# Patient Record
Sex: FEMALE | Race: WHITE | HISPANIC OR LATINO | URBAN - METROPOLITAN AREA
[De-identification: names, ages, dates, MRNs, and addresses within clinical notes are randomized per-mention and may not be internally consistent; named-entity substitution may affect disease eponyms.]

---

## 2019-05-12 ENCOUNTER — EMERGENCY (EMERGENCY)
Facility: HOSPITAL | Age: 34
LOS: 0 days | Discharge: HOME | End: 2019-05-12
Attending: STUDENT IN AN ORGANIZED HEALTH CARE EDUCATION/TRAINING PROGRAM | Admitting: STUDENT IN AN ORGANIZED HEALTH CARE EDUCATION/TRAINING PROGRAM
Payer: COMMERCIAL

## 2019-05-12 VITALS
TEMPERATURE: 98 F | RESPIRATION RATE: 18 BRPM | DIASTOLIC BLOOD PRESSURE: 58 MMHG | HEART RATE: 72 BPM | SYSTOLIC BLOOD PRESSURE: 115 MMHG

## 2019-05-12 VITALS
TEMPERATURE: 98 F | SYSTOLIC BLOOD PRESSURE: 124 MMHG | RESPIRATION RATE: 18 BRPM | HEART RATE: 79 BPM | OXYGEN SATURATION: 98 % | DIASTOLIC BLOOD PRESSURE: 77 MMHG

## 2019-05-12 DIAGNOSIS — O26.91 PREGNANCY RELATED CONDITIONS, UNSPECIFIED, FIRST TRIMESTER: ICD-10-CM

## 2019-05-12 DIAGNOSIS — R10.31 RIGHT LOWER QUADRANT PAIN: ICD-10-CM

## 2019-05-12 DIAGNOSIS — Z3A.13 13 WEEKS GESTATION OF PREGNANCY: ICD-10-CM

## 2019-05-12 DIAGNOSIS — R10.9 UNSPECIFIED ABDOMINAL PAIN: ICD-10-CM

## 2019-05-12 LAB
ALBUMIN SERPL ELPH-MCNC: 4.4 G/DL — SIGNIFICANT CHANGE UP (ref 3.5–5.2)
ALP SERPL-CCNC: 60 U/L — SIGNIFICANT CHANGE UP (ref 30–115)
ALT FLD-CCNC: 32 U/L — SIGNIFICANT CHANGE UP (ref 0–41)
ANION GAP SERPL CALC-SCNC: 16 MMOL/L — HIGH (ref 7–14)
APPEARANCE UR: CLEAR — SIGNIFICANT CHANGE UP
AST SERPL-CCNC: 20 U/L — SIGNIFICANT CHANGE UP (ref 0–41)
BACTERIA # UR AUTO: ABNORMAL /HPF
BASOPHILS # BLD AUTO: 0.05 K/UL — SIGNIFICANT CHANGE UP (ref 0–0.2)
BASOPHILS NFR BLD AUTO: 0.5 % — SIGNIFICANT CHANGE UP (ref 0–1)
BILIRUB SERPL-MCNC: 0.3 MG/DL — SIGNIFICANT CHANGE UP (ref 0.2–1.2)
BILIRUB UR-MCNC: NEGATIVE — SIGNIFICANT CHANGE UP
BLD GP AB SCN SERPL QL: SIGNIFICANT CHANGE UP
BUN SERPL-MCNC: 9 MG/DL — LOW (ref 10–20)
CALCIUM SERPL-MCNC: 9.6 MG/DL — SIGNIFICANT CHANGE UP (ref 8.5–10.1)
CHLORIDE SERPL-SCNC: 103 MMOL/L — SIGNIFICANT CHANGE UP (ref 98–110)
CO2 SERPL-SCNC: 18 MMOL/L — SIGNIFICANT CHANGE UP (ref 17–32)
COLOR SPEC: YELLOW — SIGNIFICANT CHANGE UP
CREAT SERPL-MCNC: 0.6 MG/DL — LOW (ref 0.7–1.5)
DIFF PNL FLD: NEGATIVE — SIGNIFICANT CHANGE UP
EOSINOPHIL # BLD AUTO: 0.16 K/UL — SIGNIFICANT CHANGE UP (ref 0–0.7)
EOSINOPHIL NFR BLD AUTO: 1.7 % — SIGNIFICANT CHANGE UP (ref 0–8)
EPI CELLS # UR: ABNORMAL /HPF
GLUCOSE SERPL-MCNC: 99 MG/DL — SIGNIFICANT CHANGE UP (ref 70–99)
GLUCOSE UR QL: NEGATIVE MG/DL — SIGNIFICANT CHANGE UP
HCG SERPL-ACNC: HIGH MIU/ML
HCT VFR BLD CALC: 38.5 % — SIGNIFICANT CHANGE UP (ref 37–47)
HGB BLD-MCNC: 12.5 G/DL — SIGNIFICANT CHANGE UP (ref 12–16)
IMM GRANULOCYTES NFR BLD AUTO: 0.3 % — SIGNIFICANT CHANGE UP (ref 0.1–0.3)
KETONES UR-MCNC: NEGATIVE — SIGNIFICANT CHANGE UP
LACTATE SERPL-SCNC: 1.2 MMOL/L — SIGNIFICANT CHANGE UP (ref 0.5–2.2)
LEUKOCYTE ESTERASE UR-ACNC: ABNORMAL
LIDOCAIN IGE QN: 19 U/L — SIGNIFICANT CHANGE UP (ref 7–60)
LYMPHOCYTES # BLD AUTO: 2.16 K/UL — SIGNIFICANT CHANGE UP (ref 1.2–3.4)
LYMPHOCYTES # BLD AUTO: 23.3 % — SIGNIFICANT CHANGE UP (ref 20.5–51.1)
MCHC RBC-ENTMCNC: 27.2 PG — SIGNIFICANT CHANGE UP (ref 27–31)
MCHC RBC-ENTMCNC: 32.5 G/DL — SIGNIFICANT CHANGE UP (ref 32–37)
MCV RBC AUTO: 83.9 FL — SIGNIFICANT CHANGE UP (ref 81–99)
MONOCYTES # BLD AUTO: 0.45 K/UL — SIGNIFICANT CHANGE UP (ref 0.1–0.6)
MONOCYTES NFR BLD AUTO: 4.9 % — SIGNIFICANT CHANGE UP (ref 1.7–9.3)
NEUTROPHILS # BLD AUTO: 6.42 K/UL — SIGNIFICANT CHANGE UP (ref 1.4–6.5)
NEUTROPHILS NFR BLD AUTO: 69.3 % — SIGNIFICANT CHANGE UP (ref 42.2–75.2)
NITRITE UR-MCNC: NEGATIVE — SIGNIFICANT CHANGE UP
NRBC # BLD: 0 /100 WBCS — SIGNIFICANT CHANGE UP (ref 0–0)
PH UR: 7 — SIGNIFICANT CHANGE UP (ref 5–8)
PLATELET # BLD AUTO: 276 K/UL — SIGNIFICANT CHANGE UP (ref 130–400)
POTASSIUM SERPL-MCNC: 4.2 MMOL/L — SIGNIFICANT CHANGE UP (ref 3.5–5)
POTASSIUM SERPL-SCNC: 4.2 MMOL/L — SIGNIFICANT CHANGE UP (ref 3.5–5)
PROT SERPL-MCNC: 7.1 G/DL — SIGNIFICANT CHANGE UP (ref 6–8)
PROT UR-MCNC: ABNORMAL MG/DL
RBC # BLD: 4.59 M/UL — SIGNIFICANT CHANGE UP (ref 4.2–5.4)
RBC # FLD: 15.6 % — HIGH (ref 11.5–14.5)
SODIUM SERPL-SCNC: 137 MMOL/L — SIGNIFICANT CHANGE UP (ref 135–146)
SP GR SPEC: 1.02 — SIGNIFICANT CHANGE UP (ref 1.01–1.03)
TYPE + AB SCN PNL BLD: SIGNIFICANT CHANGE UP
UROBILINOGEN FLD QL: 0.2 MG/DL — SIGNIFICANT CHANGE UP (ref 0.2–0.2)
WBC # BLD: 9.27 K/UL — SIGNIFICANT CHANGE UP (ref 4.8–10.8)
WBC # FLD AUTO: 9.27 K/UL — SIGNIFICANT CHANGE UP (ref 4.8–10.8)
WBC UR QL: SIGNIFICANT CHANGE UP /HPF

## 2019-05-12 PROCEDURE — 99285 EMERGENCY DEPT VISIT HI MDM: CPT

## 2019-05-12 PROCEDURE — 76705 ECHO EXAM OF ABDOMEN: CPT | Mod: 26

## 2019-05-12 PROCEDURE — 76770 US EXAM ABDO BACK WALL COMP: CPT | Mod: 26

## 2019-05-12 PROCEDURE — 76815 OB US LIMITED FETUS(S): CPT | Mod: 26

## 2019-05-12 RX ORDER — MORPHINE SULFATE 50 MG/1
2 CAPSULE, EXTENDED RELEASE ORAL ONCE
Refills: 0 | Status: DISCONTINUED | OUTPATIENT
Start: 2019-05-12 | End: 2019-05-12

## 2019-05-12 RX ORDER — SODIUM CHLORIDE 9 MG/ML
1000 INJECTION, SOLUTION INTRAVENOUS ONCE
Refills: 0 | Status: COMPLETED | OUTPATIENT
Start: 2019-05-12 | End: 2019-05-12

## 2019-05-12 RX ORDER — MORPHINE SULFATE 50 MG/1
4 CAPSULE, EXTENDED RELEASE ORAL ONCE
Refills: 0 | Status: DISCONTINUED | OUTPATIENT
Start: 2019-05-12 | End: 2019-05-12

## 2019-05-12 RX ORDER — SODIUM CHLORIDE 9 MG/ML
3 INJECTION INTRAMUSCULAR; INTRAVENOUS; SUBCUTANEOUS ONCE
Refills: 0 | Status: COMPLETED | OUTPATIENT
Start: 2019-05-12 | End: 2019-05-12

## 2019-05-12 RX ORDER — ONDANSETRON 8 MG/1
4 TABLET, FILM COATED ORAL ONCE
Refills: 0 | Status: COMPLETED | OUTPATIENT
Start: 2019-05-12 | End: 2019-05-12

## 2019-05-12 RX ADMIN — SODIUM CHLORIDE 3 MILLILITER(S): 9 INJECTION INTRAMUSCULAR; INTRAVENOUS; SUBCUTANEOUS at 09:05

## 2019-05-12 RX ADMIN — SODIUM CHLORIDE 2000 MILLILITER(S): 9 INJECTION, SOLUTION INTRAVENOUS at 15:50

## 2019-05-12 RX ADMIN — MORPHINE SULFATE 4 MILLIGRAM(S): 50 CAPSULE, EXTENDED RELEASE ORAL at 11:47

## 2019-05-12 RX ADMIN — ONDANSETRON 4 MILLIGRAM(S): 8 TABLET, FILM COATED ORAL at 12:30

## 2019-05-12 RX ADMIN — MORPHINE SULFATE 2 MILLIGRAM(S): 50 CAPSULE, EXTENDED RELEASE ORAL at 08:58

## 2019-05-12 RX ADMIN — SODIUM CHLORIDE 2000 MILLILITER(S): 9 INJECTION, SOLUTION INTRAVENOUS at 08:58

## 2019-05-12 NOTE — ED ADULT NURSE NOTE - NSIMPLEMENTINTERV_GEN_ALL_ED
Implemented All Universal Safety Interventions:  [place] to call system. Call bell, personal items and telephone within reach. Instruct patient to call for assistance. Room bathroom lighting operational. Non-slip footwear when patient is off stretcher. Physically safe environment: no spills, clutter or unnecessary equipment. Stretcher in lowest position, wheels locked, appropriate side rails in place.

## 2019-05-12 NOTE — ED PROVIDER NOTE - CLINICAL SUMMARY MEDICAL DECISION MAKING FREE TEXT BOX
32 y/o Preg F p/w abd and back pain. US RUQ, Renal/ Bladder, Pelvis done, + GBS w/o sign elisabeth, o/w US's WNL. No new acute ED events, but pain persisted. Pt seen by OB, who consulted pt's private OB. On reassessment, pt had marked ttp at R sciatic notch and noted some pain radiating down R buttock; also noted pain character was hard to describe. consider radiculopathy. however, dx unclear, and other more worrisome dx could not be excluded. I recc admission to pt. Pt declined. we discussed benefit of admission, including further imaging and serial exam, and risk of leaving, including, worsening pain, loss of lifestyle, hospitalization, pt and fetal mortality, Pt understood risk and benefit, diagnostic uncertainty and still preferred discharge and f/up with PMD OB tomorrow. pt given strict return precaution. Pt dc'ed.

## 2019-05-12 NOTE — CONSULT NOTE ADULT - SUBJECTIVE AND OBJECTIVE BOX
Chief Complaint:    HPI: 32yo , at 13w0d, dated by LMP with MINNIE 19, with RLQ abdominal pain. She reports RLQ abdominal pain that started 1 week ago. She states the pain is intermittent, stabbing, with associated cramping that radiates to her back and up her abdomen. She reports the pain is worse when she changes positions, and only received relief from 2nd dose of morphine in the ED. No relief from tylenol or 1st dose of morphine in the ED. She saw Dr. Heredia on Tuesday, followed by treatment by a chiropractor on Thursday, which alleviated her pain only temporally. She has not had similar pain before. Denies vaginal bleeding or abnormal discharge. She reports sexual intercourse last Saturday (8 days ago) and last bowel movement yesterday, normal. Denies CP, SOB, RUQ pain/ Epigastric pain, N/V, LE pain/ swelling, diarrhea, pain/difficulty with urination, fevers, chills.     Ob/Gyn History:    LMP -   2/10/19   Cycle Length - 4-5 days, every 21 days. Menarche at 15yo.   FT Vaginal deliveryX1, [id number] in [place].   Denies history of ovarian cysts, uterine fibroids, abnormal paps, or STIs.   Last Pap Smear - 2019    Denies the following: constitutional symptoms, visual symptoms, cardiovascular symptoms, respiratory symptoms, GI symptoms, musculoskeletal symptoms, skin symptoms, neurologic symptoms, hematologic symptoms, allergic symptoms, psychiatric symptoms  Except any pertinent positives listed.     Home Medications: PNV    Allergies: No Known Allergies    PAST MEDICAL & SURGICAL HISTORY:  No pertinent past medical history    SOCIAL HISTORY: Denies cigarette use, alcohol use, or illicit drug use    Vital Signs Last 24 Hrs  T(F): 97.6 (12 May 2019 09:15), Max: 97.6 (12 May 2019 09:15)  HR: 79 (12 May 2019 09:15) (79 - 79)  BP: 124/77 (12 May 2019 09:15) (124/77 - 124/77)  RR: 18 (12 May 2019 09:15) (18 - 18)    General Appearance - AAOx3, NAD  Heart - S1S2 regular rate and rhythm  Lung - CTA Bilaterally  Abdomen - Soft, RLQ tenderness to palpation, nondistended, no rebound, no rigidity, no guarding, hyperactive bowel sounds present.    GYN/Pelvis:  Labia Majora - Normal  Labia Minora - Normal  Clitoris - Normal  Urethra - Normal  Vagina - Normal  Cervix - Normal    Uterus:  Size - Normal  Tenderness - None  Mass - None  Freely mobile    Adnexa:  Masses - None  Tenderness - None      LABS:                        12.5   9.27  )-----------( 276      ( 12 May 2019 08:32 )             38.5     HCG Quantitative, Serum: 66205.0 mIU/mL (19 @ 08:32)    Type + Screen: O POS (19 @ 08:32)  Antibody Screen: NEG (19 @ 08:32)    05    137  |  103  |  9<L>  ----------------------------<  99  4.2   |  18  |  0.6<L>    Ca    9.6      12 May 2019 08:32    TPro  7.1  /  Alb  4.4  /  TBili  0.3  /  DBili  x   /  AST  20  /  ALT  32  /  AlkPhos  60  0512      Urinalysis Basic - ( 12 May 2019 08:32 )  Color: Yellow / Appearance: Clear / S.025 / pH: x  Gluc: x / Ketone: Negative  / Bili: Negative / Urobili: 0.2 mg/dL   Blood: x / Protein: Trace mg/dL / Nitrite: Negative   Leuk Esterase: Trace / RBC: x / WBC 3-5 /HPF   Sq Epi: x / Non Sq Epi: Moderate /HPF / Bacteria: Few /HPF          RADIOLOGY & ADDITIONAL STUDIES:  < from: US OB Fetus Limited (19 @ 10:55) >    The uterus measures 14 x 4 x 7.8 cm and contains a single live   intrauterine pregnancy. The femur length measures 1.03 cm, corresponding   to a gestational age of 13 weeks, 0 days. Fetal heart rate is 159   beats/min.   There is no free pelvic fluid.  The right ovary measures 3 x 2.8 x 2 cm. and contains a corpus luteal   cyst measuring 1.8 x 1.6 x 1.7 cm. The left ovary was not visualized.  Doppler flow is demonstrated to both ovaries.    IMPRESSION:  Single live intrauterine pregnancy corresponding to a gestational age of   13 weeks, 0 days.   Fetal heart rate is 159 beat/min.  Nonvisualization of the left ovary.  Note that this study is not a full fetal survey.  < end of copied text >        < from: US Kidney and Bladder (19 @ 11:00) >  INTERPRETATION:  CLINICAL HISTORY / REASON FOR EXAM: Abdominal pain.    Patient is pregnant.  COMPARISON: None   PROCEDURE: Retroperitoneal ultrasound was performed.  FINDINGS:  RIGHT KIDNEY: Normal in echogenicity, size measuring 11.2 cm in length.   No evidence of hydronephrosis or calculus. Vascular flow is demonstrated   at the hilum.  LEFT KIDNEY: Normal in echogenicity, size measuring 11.7 cm in length. No   evidence of hydronephrosis or calculus. Vascular flow is demonstrated at   the hilum.   URINARY BLADDER: Prevoid volume of approximately 189 mL. No wall   thickening, debris or calculus is seen. Postvoid volume is approximately   0 mL.    IMPRESSION:  No sonographic evidence of nephrolithiasis.   < end of copied text > Chief Complaint:    HPI: 34yo , at 13w0d, dated by LMP with MINNIE 19, with RLQ abdominal pain. She reports RLQ abdominal pain that started 1 week ago. She states the pain is intermittent, stabbing, with associated cramping, burning pain that radiates to her back and up her abdomen. She reports the pain is worse when she changes positions, and only received relief from 2nd dose of morphine in the ED. No relief from tylenol or 1st dose of morphine in the ED. She saw Dr. Heredia on Tuesday, followed by treatment by a chiropractor on Thursday, which alleviated her pain only temporally. She has not had similar pain before. Denies vaginal bleeding or abnormal discharge. She reports sexual intercourse last Saturday (8 days ago) and last bowel movement yesterday, normal. Denies CP, SOB, RUQ pain/ Epigastric pain, N/V, LE pain/ swelling, diarrhea, pain/difficulty with urination, fevers, chills.     Ob/Gyn History:    LMP -   2/10/19   Cycle Length - 4-5 days, every 21 days. Menarche at 13yo.   FT Vaginal deliveryX1, [id number] in [place].   Denies history of ovarian cysts, uterine fibroids, abnormal paps, or STIs.   Last Pap Smear - 2019    Denies the following: constitutional symptoms, visual symptoms, cardiovascular symptoms, respiratory symptoms, GI symptoms, musculoskeletal symptoms, skin symptoms, neurologic symptoms, hematologic symptoms, allergic symptoms, psychiatric symptoms  Except any pertinent positives listed.     Home Medications: PNV    Allergies: No Known Allergies    PAST MEDICAL & SURGICAL HISTORY:  No pertinent past medical history    SOCIAL HISTORY: Denies cigarette use, alcohol use, or illicit drug use    Vital Signs Last 24 Hrs  T(F): 97.6 (12 May 2019 09:15), Max: 97.6 (12 May 2019 09:15)  HR: 79 (12 May 2019 09:15) (79 - 79)  BP: 124/77 (12 May 2019 09:15) (124/77 - 124/77)  RR: 18 (12 May 2019 09:15) (18 - 18)    General Appearance - AAOx3, NAD  Heart - S1S2 regular rate and rhythm  Lung - CTA Bilaterally  Abdomen - Soft, Mons Pubic tenderness radiates to the RLQ, nondistended, no rebound, no rigidity, no guarding, hyperactive bowel sounds present.    GYN/Pelvis:  Mons Pubis: Tender to palpation, no erythema or warmth to palpation.   Labia Majora - Normal  Labia Minora - Normal  Clitoris - Normal  Urethra - Normal  Vagina - Normal  Cervix - Normal  Speculum: No bleeding seen from cervix, no CMT, no abnormal discharge noted     Uterus:  Size - Normal  Tenderness - None  Mass - None  Freely mobile    Adnexa:  Masses - None  Tenderness - none      LABS:                        12.5   9.27  )-----------( 276      ( 12 May 2019 08:32 )             38.5     HCG Quantitative, Serum: 17341.0 mIU/mL (19 @ 08:32)    Type + Screen: O POS (19 @ 08:32)  Antibody Screen: NEG (19 @ 08:32)        137  |  103  |  9<L>  ----------------------------<  99  4.2   |  18  |  0.6<L>    Ca    9.6      12 May 2019 08:32    TPro  7.1  /  Alb  4.4  /  TBili  0.3  /  DBili  x   /  AST  20  /  ALT  32  /  AlkPhos  60  12      Urinalysis Basic - ( 12 May 2019 08:32 )  Color: Yellow / Appearance: Clear / S.025 / pH: x  Gluc: x / Ketone: Negative  / Bili: Negative / Urobili: 0.2 mg/dL   Blood: x / Protein: Trace mg/dL / Nitrite: Negative   Leuk Esterase: Trace / RBC: x / WBC 3-5 /HPF   Sq Epi: x / Non Sq Epi: Moderate /HPF / Bacteria: Few /HPF          RADIOLOGY & ADDITIONAL STUDIES:    Bedside TVUS 19 @1300: Left ovary not visualized, no abnormal findings. IUP, Active fetal movement visualized. No fluid seen in the posterior cul-de-sac. No abnormalities seen in abdominal wall.     < from: US OB Fetus Limited (19 @ 10:55) >    The uterus measures 14 x 4 x 7.8 cm and contains a single live   intrauterine pregnancy. The femur length measures 1.03 cm, corresponding   to a gestational age of 13 weeks, 0 days. Fetal heart rate is 159   beats/min.   There is no free pelvic fluid.  The right ovary measures 3 x 2.8 x 2 cm. and contains a corpus luteal   cyst measuring 1.8 x 1.6 x 1.7 cm. The left ovary was not visualized.  Doppler flow is demonstrated to both ovaries.    IMPRESSION:  Single live intrauterine pregnancy corresponding to a gestational age of   13 weeks, 0 days.   Fetal heart rate is 159 beat/min.  Nonvisualization of the left ovary.  Note that this study is not a full fetal survey.  < end of copied text >        < from: US Kidney and Bladder (19 @ 11:00) >  INTERPRETATION:  CLINICAL HISTORY / REASON FOR EXAM: Abdominal pain.    Patient is pregnant.  COMPARISON: None   PROCEDURE: Retroperitoneal ultrasound was performed.  FINDINGS:  RIGHT KIDNEY: Normal in echogenicity, size measuring 11.2 cm in length.   No evidence of hydronephrosis or calculus. Vascular flow is demonstrated   at the hilum.  LEFT KIDNEY: Normal in echogenicity, size measuring 11.7 cm in length. No   evidence of hydronephrosis or calculus. Vascular flow is demonstrated at   the hilum.   URINARY BLADDER: Prevoid volume of approximately 189 mL. No wall   thickening, debris or calculus is seen. Postvoid volume is approximately   0 mL.    IMPRESSION:  No sonographic evidence of nephrolithiasis.   < end of copied text >    ABDOMINAL SONOGRAM PENDING Chief Complaint:    HPI: 34yo , at 13w0d, dated by LMP with MINNIE 19, with RLQ abdominal pain. She reports RLQ abdominal pain that started 1 week ago. She states the pain is intermittent, stabbing, with associated cramping, burning pain that radiates to her back and up her abdomen. She reports the pain is worse when she changes positions, and only received relief from 2nd dose of morphine in the ED. No relief from tylenol or 1st dose of morphine in the ED. She saw Dr. Heredia on Tuesday, followed by treatment by a chiropractor on Thursday, which alleviated her pain only temporally. She has not had similar pain before. Denies vaginal bleeding or abnormal discharge. She reports sexual intercourse last Saturday (8 days ago) and last bowel movement yesterday, normal. Denies CP, SOB, RUQ pain/ Epigastric pain, N/V, LE pain/ swelling, diarrhea, pain/difficulty with urination, fevers, chills.     Ob/Gyn History:    LMP -   2/10/19   Cycle Length - 4-5 days, every 21 days. Menarche at 15yo.   FT Vaginal deliveryX1, [id number] in [place].   Denies history of ovarian cysts, uterine fibroids, STIs.     Denies the following: constitutional symptoms, visual symptoms, cardiovascular symptoms, respiratory symptoms, GI symptoms, musculoskeletal symptoms, skin symptoms, neurologic symptoms, hematologic symptoms, allergic symptoms, psychiatric symptoms  Except any pertinent positives listed.     Home Medications: PNV    Allergies: No Known Allergies    PAST MEDICAL & SURGICAL HISTORY:  No pertinent past medical history    SOCIAL HISTORY: Denies cigarette use, alcohol use, or illicit drug use    Vital Signs Last 24 Hrs  T(F): 97.6 (12 May 2019 09:15), Max: 97.6 (12 May 2019 09:15)  HR: 79 (12 May 2019 09:15) (79 - 79)  BP: 124/77 (12 May 2019 09:15) (124/77 - 124/77)  RR: 18 (12 May 2019 09:15) (18 - 18)    General Appearance - AAOx3, NAD  Heart - S1S2 regular rate and rhythm  Lung - CTA Bilaterally  Abdomen - Soft, Mons Pubis tenderness radiates to the RLQ, nondistended, no rebound, no rigidity, no guarding, hyperactive bowel sounds present.    GYN/Pelvis:  Mons Pubis: Tender to palpation, no erythema or warmth to palpation.   Labia Majora - Normal  Labia Minora - Normal  Clitoris - Normal  Urethra - Normal  Vagina - Normal  Cervix - Normal  Speculum: No bleeding seen from cervix, no CMT, no abnormal discharge noted     Uterus:  Size - Normal  Tenderness - None  Mass - None  Freely mobile    Adnexa:  Masses - None  Tenderness - none      LABS:                        12.5   9.27  )-----------( 276      ( 12 May 2019 08:32 )             38.5     HCG Quantitative, Serum: 40749.0 mIU/mL (19 @ 08:32)    Type + Screen: O POS (19 @ 08:32)  Antibody Screen: NEG (19 @ 08:32)        137  |  103  |  9<L>  ----------------------------<  99  4.2   |  18  |  0.6<L>    Ca    9.6      12 May 2019 08:32    TPro  7.1  /  Alb  4.4  /  TBili  0.3  /  DBili  x   /  AST  20  /  ALT  32  /  AlkPhos  60  0512      Urinalysis Basic - ( 12 May 2019 08:32 )  Color: Yellow / Appearance: Clear / S.025 / pH: x  Gluc: x / Ketone: Negative  / Bili: Negative / Urobili: 0.2 mg/dL   Blood: x / Protein: Trace mg/dL / Nitrite: Negative   Leuk Esterase: Trace / RBC: x / WBC 3-5 /HPF   Sq Epi: x / Non Sq Epi: Moderate /HPF / Bacteria: Few /HPF          RADIOLOGY & ADDITIONAL STUDIES:    Bedside TVUS 19 @1300: Left ovary not visualized, no abnormal findings. IUP, Active fetal movement visualized. No fluid seen in the posterior cul-de-sac. No abnormalities seen in abdominal wall.     < from: US OB Fetus Limited (19 @ 10:55) >    The uterus measures 14 x 4 x 7.8 cm and contains a single live   intrauterine pregnancy. The femur length measures 1.03 cm, corresponding   to a gestational age of 13 weeks, 0 days. Fetal heart rate is 159   beats/min.   There is no free pelvic fluid.  The right ovary measures 3 x 2.8 x 2 cm. and contains a corpus luteal   cyst measuring 1.8 x 1.6 x 1.7 cm. The left ovary was not visualized.  Doppler flow is demonstrated to both ovaries.    IMPRESSION:  Single live intrauterine pregnancy corresponding to a gestational age of   13 weeks, 0 days.   Fetal heart rate is 159 beat/min.  Nonvisualization of the left ovary.  Note that this study is not a full fetal survey.  < end of copied text >        < from: US Kidney and Bladder (19 @ 11:00) >  INTERPRETATION:  CLINICAL HISTORY / REASON FOR EXAM: Abdominal pain.    Patient is pregnant.  COMPARISON: None   PROCEDURE: Retroperitoneal ultrasound was performed.  FINDINGS:  RIGHT KIDNEY: Normal in echogenicity, size measuring 11.2 cm in length.   No evidence of hydronephrosis or calculus. Vascular flow is demonstrated   at the hilum.  LEFT KIDNEY: Normal in echogenicity, size measuring 11.7 cm in length. No   evidence of hydronephrosis or calculus. Vascular flow is demonstrated at   the hilum.   URINARY BLADDER: Prevoid volume of approximately 189 mL. No wall   thickening, debris or calculus is seen. Postvoid volume is approximately   0 mL.    IMPRESSION:  No sonographic evidence of nephrolithiasis.   < end of copied text >    ABDOMINAL SONOGRAM PENDING Chief Complaint:    HPI: 34yo , at 13w0d, dated by LMP with MINNIE 19, with RLQ abdominal pain. She reports RLQ abdominal pain that started 1 week ago. She states the pain is intermittent, stabbing, with associated cramping, burning pain that radiates to her back and up her abdomen. She reports the pain is worse when she changes positions, and only received relief from 2nd dose of morphine in the ED. No relief from tylenol or 1st dose of morphine in the ED. She saw Dr. Heredia on Tuesday, followed by treatment by a chiropractor on Thursday, which alleviated her pain only temporally. She has not had similar pain before. Denies vaginal bleeding or abnormal discharge. She reports sexual intercourse last Saturday (8 days ago) and last bowel movement yesterday, normal. Denies CP, SOB, RUQ pain/ Epigastric pain, N/V, LE pain/ swelling, diarrhea, pain/difficulty with urination, fevers, chills.     Ob/Gyn History:    LMP -   2/10/19   Cycle Length - 4-5 days, every 21 days. Menarche at 15yo.   FT Vaginal deliveryX1, [id number] in [place].   Denies history of ovarian cysts, uterine fibroids, STIs.     Denies the following: constitutional symptoms, visual symptoms, cardiovascular symptoms, respiratory symptoms, GI symptoms, musculoskeletal symptoms, skin symptoms, neurologic symptoms, hematologic symptoms, allergic symptoms, psychiatric symptoms  Except any pertinent positives listed.     Home Medications: PNV    Allergies: No Known Allergies    PAST MEDICAL & SURGICAL HISTORY:  No pertinent past medical history    SOCIAL HISTORY: Denies cigarette use, alcohol use, or illicit drug use    Vital Signs Last 24 Hrs  T(F): 97.6 (12 May 2019 09:15), Max: 97.6 (12 May 2019 09:15)  HR: 79 (12 May 2019 09:15) (79 - 79)  BP: 124/77 (12 May 2019 09:15) (124/77 - 124/77)  RR: 18 (12 May 2019 09:15) (18 - 18)    General Appearance - AAOx3, NAD  Heart - S1S2 regular rate and rhythm  Lung - CTA Bilaterally  Abdomen - Soft, Mons Pubis tenderness radiates to the RLQ, nondistended, no rebound, no rigidity, no guarding, hyperactive bowel sounds present.    GYN/Pelvis:  Mons Pubis: Tender to palpation, no erythema or warmth to palpation.   Labia Majora - Normal  Labia Minora - Normal  Clitoris - Normal  Urethra - Normal  Vagina - Normal  Cervix - Normal  Speculum: No bleeding seen from cervix, no CMT, no abnormal discharge noted     Uterus:  Size - Normal  Tenderness - None  Mass - None  Freely mobile    Adnexa:  Masses - None  Tenderness - none      LABS:                        12.5   9.27  )-----------( 276      ( 12 May 2019 08:32 )             38.5     HCG Quantitative, Serum: 10735.0 mIU/mL (19 @ 08:32)    Type + Screen: O POS (19 @ 08:32)  Antibody Screen: NEG (19 @ 08:32)        137  |  103  |  9<L>  ----------------------------<  99  4.2   |  18  |  0.6<L>    Ca    9.6      12 May 2019 08:32    TPro  7.1  /  Alb  4.4  /  TBili  0.3  /  DBili  x   /  AST  20  /  ALT  32  /  AlkPhos  60  0512      Urinalysis Basic - ( 12 May 2019 08:32 )  Color: Yellow / Appearance: Clear / S.025 / pH: x  Gluc: x / Ketone: Negative  / Bili: Negative / Urobili: 0.2 mg/dL   Blood: x / Protein: Trace mg/dL / Nitrite: Negative   Leuk Esterase: Trace / RBC: x / WBC 3-5 /HPF   Sq Epi: x / Non Sq Epi: Moderate /HPF / Bacteria: Few /HPF          RADIOLOGY & ADDITIONAL STUDIES:    Bedside TVUS 19 @1300: Left ovary not visualized, no abnormal findings. IUP, Active fetal movement visualized. No fluid seen in the posterior cul-de-sac. No abnormalities seen in abdominal wall.     < from: US OB Fetus Limited (19 @ 10:55) >    The uterus measures 14 x 4 x 7.8 cm and contains a single live   intrauterine pregnancy. The femur length measures 1.03 cm, corresponding   to a gestational age of 13 weeks, 0 days. Fetal heart rate is 159   beats/min.   There is no free pelvic fluid.  The right ovary measures 3 x 2.8 x 2 cm. and contains a corpus luteal   cyst measuring 1.8 x 1.6 x 1.7 cm. The left ovary was not visualized.  Doppler flow is demonstrated to both ovaries.    IMPRESSION:  Single live intrauterine pregnancy corresponding to a gestational age of   13 weeks, 0 days.   Fetal heart rate is 159 beat/min.  Nonvisualization of the left ovary.  Note that this study is not a full fetal survey.  < end of copied text >        < from: US Kidney and Bladder (19 @ 11:00) >  INTERPRETATION:  CLINICAL HISTORY / REASON FOR EXAM: Abdominal pain.    Patient is pregnant.  COMPARISON: None   PROCEDURE: Retroperitoneal ultrasound was performed.  FINDINGS:  RIGHT KIDNEY: Normal in echogenicity, size measuring 11.2 cm in length.   No evidence of hydronephrosis or calculus. Vascular flow is demonstrated   at the hilum.  LEFT KIDNEY: Normal in echogenicity, size measuring 11.7 cm in length. No   evidence of hydronephrosis or calculus. Vascular flow is demonstrated at   the hilum.   URINARY BLADDER: Prevoid volume of approximately 189 mL. No wall   thickening, debris or calculus is seen. Postvoid volume is approximately   0 mL.    IMPRESSION:  No sonographic evidence of nephrolithiasis.   < end of copied text >    ABDOMINAL SONOGRAM:   < from: US Abdomen Limited (19 @ 14:45) >  FINDINGS:    LIVER:  Normal in contour and echogenicity measuring 16.0 cm in length.   No focal mass.    GALLBLADDER/BILIARY TREE:  Cholelithiasis and sludge. No wall thickening   or pericholecystic fluid.  Negative sonographic Medel's sign. No   intrahepatic biliary ductal dilatation. The common bile duct measures 4   mm, which is normal.     PANCREAS:  Visualized head and body are unremarkable. Remainder obscured   by overlying bowel gas.    KIDNEY: See renal ultrasound performed on the same day.    AORTA/IVC:  Visualized proximal portions unremarkable.    ASCITES:  None.    IMPRESSION:    Cholelithiasis without evidence of cholecystitis.    < end of copied text >  < from: US Abdomen Limited (19 @ 14:42) >  FINDINGS/  IMPRESSION:    Appendix not visualized. No free fluid.    < end of copied text >

## 2019-05-12 NOTE — ED PROVIDER NOTE - NS ED ROS FT
Constitutional: NAD  Eyes: No visual changes, eye pain or discharge.  ENMT: No hearing changes, pain, discharge or infections. No neck pain or stiffness.  Cardiac: No chest pain, SOB or edema. No chest pain with exertion.  Respiratory: No cough or respiratory distress.   GI: No nausea, vomiting, diarrhea. + abdominal pain.  : No dysuria, frequency or burning.  MS: No myalgia, muscle weakness, joint pain. +back pain.  Neuro: No headache or weakness. No LOC.  Skin: No skin rash.  Heme: No bruising

## 2019-05-12 NOTE — ED PROVIDER NOTE - ATTENDING CONTRIBUTION TO CARE
34 y/o preg F at 13 wks , p/w R lower abd/ pelvic pain x 5-6d. Gradual onset at back now more localized to anterior pelvis/ abd, was intermittent now constant.  Saw GYN, had US, was "ok", but sx persisted so came to ED. ROS PE above, abd soft; + ttp lower RLQ/ R pelvis no r/g  IMP: torsion vs uti vs hernia; consider renal stone; appy seems unlikely  P: labs, US, UA, reassess.

## 2019-05-12 NOTE — ED PROVIDER NOTE - PHYSICAL EXAMINATION
General: AOx4, uncomfortable appearing, NAD, speaking in full sentences.   Skin: Warm and dry, no acute rash.   Head:  Normocephalic, atraumatic.   EENT: PERRL/EOMI, conjunctiva and sclera clear. MM moist, no nasal discharge.    Neck: Supple nt, no meningeal signs.   Lymph: No acute cervical lymphadenopathy  Heart RRR s1s2 nl, no rub/murmur.   Lungs- No retractions, BS equal, CTAB.   Abdomen: Soft ttp over RLQ w/ guarding.  Extremities- moves all, +equal distal pulses, brisk cap refill. No LE edema, calves nttp b/l.  Neuro: Sensation wnl, CN 2-12 grossly intact. +5/5 muscle strength throughout.  Psych: Cooperative, appropriate

## 2019-05-12 NOTE — CONSULT NOTE ADULT - ASSESSMENT
34yo , at 13w0d, dated by LMP with MINNIE 19, with right sided abdominal pain, clinically and hemodynamically stable, with normal imaging and labs, likely musculoskeletal in nature, no evidence for appendicitis or nephrolithiasis at this time.   -f/u on 19 at [facility]   -infection precautions given   -Percocet sent to pharmacy for pain  -Warm compresses and rest   - precautions given    Dr. Mensah and Dr. Salgado aware

## 2019-05-12 NOTE — ED PROVIDER NOTE - OBJECTIVE STATEMENT
33F no pmh at 13 weeks EGA p/w worsening intermittent low back pain radiating into R lower abdomen and groin since Tuesday. Pt saw her OBGYN Dr Irby sp? and had an ultrasound and was told everything looked ok and was advised to come to the ED if her pain did not improve. Pt denies f/c, n/v/d/c, dysuria, hematuria, vaginal bleeding or discharge, pregnancy complications, hx of kidney stones. 33F no pmh at 13 weeks EGA p/w worsening intermittent low back pain radiating into R lower abdomen and groin since Tuesday. Pt saw her OBGYN Dr Packer sp? and had an ultrasound and was told everything looked ok and was advised to come to the ED if her pain did not improve. Pt denies f/c, n/v/d/c, dysuria, hematuria, vaginal bleeding or discharge, pregnancy complications, hx of kidney stones.

## 2019-05-12 NOTE — ED PROVIDER NOTE - PROGRESS NOTE DETAILS
pt still c/o moderate to severe pain, will c/s OBGYN spoke w/ OBGYN, requesting abd US of appendix and TV US to better evaluate the ovaries. will see pt and advise on further imaging. OB resident requesting RUQ US. Also aware that US dept will not do TV >12 weeks so OB will come to ED to do TV US. ok to admit to OB service per resident sophie. Dr Mensah attending. pt does not want to stay for MRI. case d/w OB and Dr Cade who is comfortable w/ patient being d/c home. OB is going to write for percocet. Pt has appt tomorrow morning w/ Dr Cade for re evaluation. Pt given strict return precautions including worsening pain, fever, or vomiting.

## 2019-05-12 NOTE — ED ADULT NURSE NOTE - OBJECTIVE STATEMENT
Patient complains of lower right abdominal pain radiating to back, states she is approximately 13 weeks pregnant, denies bleeding, denies n/v/d

## 2019-05-12 NOTE — ED PROVIDER NOTE - CARE PROVIDER_API CALL
Katey Mensah)  Obstetrics and Gynecology  [address] [place] [address]  Phone: [phone]  Fax: [phone]  Follow Up Time:

## 2019-05-13 LAB
CULTURE RESULTS: SIGNIFICANT CHANGE UP
SPECIMEN SOURCE: SIGNIFICANT CHANGE UP

## 2019-10-08 ENCOUNTER — OUTPATIENT (OUTPATIENT)
Dept: OUTPATIENT SERVICES | Facility: HOSPITAL | Age: 34
LOS: 1 days | Discharge: HOME | End: 2019-10-08

## 2019-10-08 VITALS
HEART RATE: 97 BPM | RESPIRATION RATE: 20 BRPM | DIASTOLIC BLOOD PRESSURE: 60 MMHG | SYSTOLIC BLOOD PRESSURE: 122 MMHG | TEMPERATURE: 98 F

## 2019-10-08 VITALS — HEART RATE: 97 BPM | DIASTOLIC BLOOD PRESSURE: 60 MMHG | SYSTOLIC BLOOD PRESSURE: 122 MMHG

## 2019-10-08 DIAGNOSIS — O36.8190 DECREASED FETAL MOVEMENTS, UNSPECIFIED TRIMESTER, NOT APPLICABLE OR UNSPECIFIED: ICD-10-CM

## 2019-10-08 DIAGNOSIS — O26.93 PREGNANCY RELATED CONDITIONS, UNSPECIFIED, THIRD TRIMESTER: ICD-10-CM

## 2019-10-08 NOTE — OB PROVIDER TRIAGE NOTE - NSHPPHYSICALEXAM_GEN_ALL_CORE
ICU Vital Signs Last 24 Hrs  T(C): 36.8 (08 Oct 2019 22:35), Max: 36.8 (08 Oct 2019 22:35)  T(F): 98.2 (08 Oct 2019 22:35), Max: 98.2 (08 Oct 2019 22:35)  HR: 97 (08 Oct 2019 22:35) (97 - 97)  BP: 122/60 (08 Oct 2019 22:35) (122/60 - 122/60)  BP(mean): --  ABP: --  ABP(mean): --  RR: 20 (08 Oct 2019 22:35) (20 - 20)  SpO2: --    Udip negative ICU Vital Signs Last 24 Hrs  T(C): 36.8 (08 Oct 2019 22:35), Max: 36.8 (08 Oct 2019 22:35)  T(F): 98.2 (08 Oct 2019 22:35), Max: 98.2 (08 Oct 2019 22:35)  HR: 97 (08 Oct 2019 22:35) (97 - 97)  BP: 122/60 (08 Oct 2019 22:35) (122/60 - 122/60)  BP(mean): --  ABP: --  ABP(mean): --  RR: 20 (08 Oct 2019 22:35) (20 - 20)  SpO2: --    Udip negative  EFM: 150/mod judy/+accels  TOCO irregular(not feeling them)  SVE: deferred  Abd: soft, obese, nontender, no palpable ctx

## 2019-10-08 NOTE — OB PROVIDER TRIAGE NOTE - NSOBPROVIDERNOTE_OBGYN_ALL_OB_FT
32 yo  @ 34w2d, BPP , with reassuring fetal and maternal well being  dc home  FKC instructions  PO hydrations  PTL precautions given    DR. Bautista aware

## 2019-10-08 NOTE — OB PROVIDER TRIAGE NOTE - HISTORY OF PRESENT ILLNESS
34 yo  @ 34w2d, EDc 19 by first trimester sonogram presents with decreased FM x 1 day even after eating somethign sweet and drinking cold fluids, showering, walking around. Since being in triage patient has felt the baby move 6 times, but with less intensity than before. Denies contractions, VB, LOF. Denies any complications during this pregnancy.

## 2019-10-08 NOTE — OB PROVIDER TRIAGE NOTE - NS_OBGYNHISTORY_OBGYN_ALL_OB_FT
Ob: 2018 , 39w6d, 8.5lbs, no complications    Gynhx: denies h/o fibroids, cysts, STIs  h/o abnormal pap smear -->s/p colposcopy, normal

## 2019-10-09 PROBLEM — Z78.9 OTHER SPECIFIED HEALTH STATUS: Chronic | Status: ACTIVE | Noted: 2019-05-12

## 2019-11-13 ENCOUNTER — INPATIENT (INPATIENT)
Facility: HOSPITAL | Age: 34
LOS: 3 days | Discharge: HOME | End: 2019-11-17
Attending: OBSTETRICS & GYNECOLOGY | Admitting: OBSTETRICS & GYNECOLOGY

## 2019-11-13 VITALS
SYSTOLIC BLOOD PRESSURE: 129 MMHG | TEMPERATURE: 98 F | HEART RATE: 89 BPM | DIASTOLIC BLOOD PRESSURE: 70 MMHG | RESPIRATION RATE: 16 BRPM | WEIGHT: 259.93 LBS | HEIGHT: 62 IN

## 2019-11-13 DIAGNOSIS — E66.9 OBESITY, UNSPECIFIED: ICD-10-CM

## 2019-11-13 LAB
ALBUMIN SERPL ELPH-MCNC: 4 G/DL — SIGNIFICANT CHANGE UP (ref 3.5–5.2)
ALP SERPL-CCNC: 176 U/L — HIGH (ref 30–115)
ALT FLD-CCNC: 13 U/L — SIGNIFICANT CHANGE UP (ref 0–41)
ANION GAP SERPL CALC-SCNC: 17 MMOL/L — HIGH (ref 7–14)
APPEARANCE UR: ABNORMAL
AST SERPL-CCNC: 21 U/L — SIGNIFICANT CHANGE UP (ref 0–41)
BACTERIA # UR AUTO: ABNORMAL
BILIRUB SERPL-MCNC: <0.2 MG/DL — SIGNIFICANT CHANGE UP (ref 0.2–1.2)
BILIRUB UR-MCNC: NEGATIVE — SIGNIFICANT CHANGE UP
BLD GP AB SCN SERPL QL: SIGNIFICANT CHANGE UP
BUN SERPL-MCNC: 11 MG/DL — SIGNIFICANT CHANGE UP (ref 10–20)
CALCIUM SERPL-MCNC: 9.5 MG/DL — SIGNIFICANT CHANGE UP (ref 8.5–10.1)
CHLORIDE SERPL-SCNC: 102 MMOL/L — SIGNIFICANT CHANGE UP (ref 98–110)
CO2 SERPL-SCNC: 17 MMOL/L — SIGNIFICANT CHANGE UP (ref 17–32)
COLOR SPEC: YELLOW — SIGNIFICANT CHANGE UP
CREAT SERPL-MCNC: 0.6 MG/DL — LOW (ref 0.7–1.5)
DIFF PNL FLD: NEGATIVE — SIGNIFICANT CHANGE UP
EPI CELLS # UR: 15 /HPF — HIGH (ref 0–5)
GLUCOSE SERPL-MCNC: 83 MG/DL — SIGNIFICANT CHANGE UP (ref 70–99)
GLUCOSE UR QL: NEGATIVE — SIGNIFICANT CHANGE UP
HYALINE CASTS # UR AUTO: 2 /LPF — SIGNIFICANT CHANGE UP (ref 0–7)
KETONES UR-MCNC: ABNORMAL
LDH SERPL L TO P-CCNC: 204 — SIGNIFICANT CHANGE UP (ref 50–242)
LEUKOCYTE ESTERASE UR-ACNC: ABNORMAL
NITRITE UR-MCNC: NEGATIVE — SIGNIFICANT CHANGE UP
PH UR: 6 — SIGNIFICANT CHANGE UP (ref 5–8)
POTASSIUM SERPL-MCNC: 3.8 MMOL/L — SIGNIFICANT CHANGE UP (ref 3.5–5)
POTASSIUM SERPL-SCNC: 3.8 MMOL/L — SIGNIFICANT CHANGE UP (ref 3.5–5)
PRENATAL SYPHILIS TEST: SIGNIFICANT CHANGE UP
PROT SERPL-MCNC: 6.7 G/DL — SIGNIFICANT CHANGE UP (ref 6–8)
PROT UR-MCNC: ABNORMAL
RBC CASTS # UR COMP ASSIST: 2 /HPF — SIGNIFICANT CHANGE UP (ref 0–4)
SODIUM SERPL-SCNC: 136 MMOL/L — SIGNIFICANT CHANGE UP (ref 135–146)
SP GR SPEC: 1.03 — HIGH (ref 1.01–1.02)
UROBILINOGEN FLD QL: SIGNIFICANT CHANGE UP
WBC UR QL: 16 /HPF — HIGH (ref 0–5)

## 2019-11-13 RX ORDER — DINOPROSTONE 10 MG/241MG
10 INSERT VAGINAL ONCE
Refills: 0 | Status: COMPLETED | OUTPATIENT
Start: 2019-11-13 | End: 2019-11-13

## 2019-11-13 RX ORDER — SODIUM CHLORIDE 9 MG/ML
1000 INJECTION, SOLUTION INTRAVENOUS
Refills: 0 | Status: DISCONTINUED | OUTPATIENT
Start: 2019-11-13 | End: 2019-11-16

## 2019-11-13 RX ORDER — OXYTOCIN 10 UNIT/ML
333.33 VIAL (ML) INJECTION
Qty: 20 | Refills: 0 | Status: DISCONTINUED | OUTPATIENT
Start: 2019-11-13 | End: 2019-11-15

## 2019-11-13 RX ORDER — CITRIC ACID/SODIUM CITRATE 300-500 MG
15 SOLUTION, ORAL ORAL EVERY 6 HOURS
Refills: 0 | Status: DISCONTINUED | OUTPATIENT
Start: 2019-11-13 | End: 2019-11-16

## 2019-11-13 RX ADMIN — Medication 15 MILLILITER(S): at 22:56

## 2019-11-13 RX ADMIN — DINOPROSTONE 10 MILLIGRAM(S): 10 INSERT VAGINAL at 22:45

## 2019-11-13 NOTE — OB PROVIDER H&P - NSHPLABSRESULTS_GEN_ALL_CORE
Labs:  HbSAg: neg  HIV: neg  RPR: NR  Blood Type: O pos   Rubella: immune  Varicella: immune  GCT: 116  GBS: neg  1st trimester screen: neg       Sono:  @22w4d: anatomy scan wnl   @36w4d: [id number] (63%), BPP-8/8

## 2019-11-13 NOTE — OB PROVIDER H&P - NS_OBGYNHISTORY_OBGYN_ALL_OB_FT
OB Hx:   G1) 11/28/2018, [id number], gHTN and manual removal of retained placenta     Gyn Hx: Abnl pap smear in 2013 requiring colposcopy. Normal pap smear in 2017. Denies ovarian cysts uterine fibroids, or STIs

## 2019-11-13 NOTE — OB PROVIDER H&P - ASSESSMENT
32yo  at 39w3d GA, Rh positive, GBS negative, h/o shortened interval between gestations, h/o gHTN in prior gestation on ASA this gestation (stopped at ) for elective IOL   -admit to labor and delivery  -pain management prn   -continous efm & toco  -admission labs  -IV hydration   -diet: CLD   -induction: cervidil     Dr. Rosario aware and Dr. Heredia to be made aware 34yo  at 39w3d GA, Rh positive, GBS negative, h/o shortened interval between gestations, h/o gHTN in prior gestation, obesity, for elective IOL   -admit to labor and delivery  -pain management prn   -continous efm & toco  -admission labs  -IV hydration   -diet: CLD   -induction: cervidil     Dr. Rosario aware and Dr. Heredia to be made aware 34yo  at 39w3d GA, Rh positive, GBS negative, h/o shortened interval between gestations, h/o gHTN in prior gestation, with elevated blood pressures on admission- r/o gHTN vs pre-eclampsia, obesity, for elective IOL   -admit to labor and delivery  -pain management prn   -continous efm & toco  -admission labs  -IV hydration   -BPs [id number]   -diet: CLD   -induction: cervidil     Dr. Rosario aware and Dr. Heredia to be made aware 35yo  at 39w3d GA, Rh positive, GBS negative, h/o shortened interval between gestations, h/o gHTN in prior gestation, with elevated blood pressures on admission- r/o gHTN vs pre-eclampsia, obesity, for elective IOL   -admit to labor and delivery  -pain management prn   -continous efm & toco  -admission labs  -IV hydration   -BPs [id number]   -diet: CLD   -induction: cervidil     Dr. Rosario aware and Dr. Heredia to be made aware 32yo  at 39w3d GA, Rh positive, GBS negative, h/o shortened interval between gestations, h/o gHTN in prior gestation, with elevated blood pressures on admission- r/o gHTN vs pre-eclampsia, obesity, for elective IOL   -admit to labor and delivery  -pain management prn   -continous efm & toco  -admission labs  -IV hydration   -BPs [id number]   -diet: CLD   -induction: cervidil     Dr. Rosario aware and Dr. Heredia to be made aware

## 2019-11-13 NOTE — OB PROVIDER H&P - HISTORY OF PRESENT ILLNESS
34yo  at 39w3d GA dated by LMP 02/10/19 presents to L&D for IOL for ....... Pt reports good fetal movement. Pt denies vaginal bleeding, LOF, or contractions. GBS negative. Denies complications in this pregnancy. H/o gestation hypertension in G1 with manual removal of retained placenta; taking ASA 81mg stopped at XX Pt denies HA, vision changes, SOB, cough, N/V/D, epigastric or RUQ pain, erythema or pain in lower extremities. 32yo  at 39w3d GA dated by LMP 02/10/19 presents to L&D for elective induction at term. Pt reports good fetal movement. Pt denies vaginal bleeding, LOF, or contractions. GBS negative. Denies complications in this pregnancy. H/o gestation hypertension in G1 with manual removal of retained placenta; was supposed to take ASA 81mg but never started taking. it Pt denies HA, vision changes, SOB, cough, N/V/D, epigastric or RUQ pain, erythema or pain in lower extremities. 35yo  at 39w3d GA dated by LMP 02/10/19 presents to L&D for elective induction at term. Pt reports good fetal movement. Pt denies vaginal bleeding, LOF, or contractions. GBS negative. Denies complications in this pregnancy. H/o gestation hypertension in G1 with manual removal of retained placenta; was supposed to take ASA 81mg but never started taking. it Pt denies HA, vision changes, SOB, cough, N/V/D, epigastric or RUQ pain, erythema or pain in lower extremities. 34yo  at 39w3d GA dated by LMP 02/10/19 presents to L&D for elective induction at term. Pt reports good fetal movement. Pt denies vaginal bleeding, LOF, or contractions. GBS negative. Denies complications in this pregnancy. H/o gestation hypertension in G1 with manual removal of retained placenta; was supposed to take ASA 81mg but never started taking. it Pt denies HA, vision changes, SOB, cough, N/V/D, epigastric or RUQ pain, erythema or pain in lower extremities.

## 2019-11-13 NOTE — OB RN PATIENT PROFILE - ABILITY TO HEAR (WITH HEARING AID OR HEARING APPLIANCE IF NORMALLY USED):
Adequate: hears normal conversation without difficulty Hypothyroid Acute pain of left knee Acute pain of left knee Acute pain of left knee

## 2019-11-13 NOTE — OB PROVIDER IHI INDUCTION/AUGMENTATION NOTE - NS_CHECKALL_OBGYN_ALL_OB
Contractions pattern was reviewed/Order was written/Induction / Augmentation was discussed/H&P was completed/FHR was reviewed

## 2019-11-13 NOTE — OB PROVIDER H&P - NSHPPHYSICALEXAM_GEN_ALL_CORE
Vital Signs Last 24 Hrs  T(F): 98.1 (13 Nov 2019 21:10), Max: 98.1 (13 Nov 2019 21:10)  HR: 89 (13 Nov 2019 21:10) (89 - 89)  BP: 129/70 (13 Nov 2019 21:10) (129/70 - 129/70)  RR: 16 (13 Nov 2019 21:10) (16 - 16)    Physical Exam:  GA: AOX3, no apparent distress  Resp: CTAB  Cardio: RRR  Abd: soft, nontender, no palpable ctx, gravid  Extr: no erythema or pain to palpation bilaterally   SVE: 1/L/-3, vtx, intact     EFM:  [place]:  BSS: Vital Signs Last 24 Hrs  T(F): 98.1 (13 Nov 2019 21:10), Max: 98.1 (13 Nov 2019 21:10)  HR: 89 (13 Nov 2019 21:10) (89 - 89)  BP: 129/70 (13 Nov 2019 21:10) (129/70 - 129/70)  RR: 16 (13 Nov 2019 21:10) (16 - 16)    Physical Exam:  GA: AOX3, no apparent distress  Resp: CTAB  Cardio: RRR  Abd: soft, nontender, no palpable ctx, gravid  Extr: no erythema or pain to palpation bilaterally   SVE: 1/30/-3, vtx, intact     EFM: 130bpm/moderate variability/+accels  [place]: irregular   BSS: cephalic Vital Signs Last 24 Hrs  T(F): 98.1 (13 Nov 2019 21:10), Max: 98.1 (13 Nov 2019 21:10)  HR: 89 (13 Nov 2019 21:10) (89 - 89)  BP: 129/70 (13 Nov 2019 21:10) (129/70 - 129/70)  RR: 16 (13 Nov 2019 21:10) (16 - 16)    Physical Exam:  GA: AOX3, no apparent distress  Resp: CTAB  Cardio: RRR  Abd: soft, nontender, no palpable ctx, gravid  Extr: no erythema or pain to palpation bilaterally   SVE: 1/30/-3, vtx, intact   EFW: 3800g by Leopold's     EFM: 130bpm/moderate variability/+accels  [place]: irregular   BSS: cephalic

## 2019-11-14 LAB
AMPHET UR-MCNC: NEGATIVE — SIGNIFICANT CHANGE UP
BARBITURATES UR SCN-MCNC: NEGATIVE — SIGNIFICANT CHANGE UP
BASOPHILS # BLD AUTO: 0.05 K/UL — SIGNIFICANT CHANGE UP (ref 0–0.2)
BASOPHILS NFR BLD AUTO: 0.5 % — SIGNIFICANT CHANGE UP (ref 0–1)
BENZODIAZ UR-MCNC: NEGATIVE — SIGNIFICANT CHANGE UP
BUPRENORPHINE SCREEN, URINE RESULT: NEGATIVE — SIGNIFICANT CHANGE UP
COCAINE METAB.OTHER UR-MCNC: NEGATIVE — SIGNIFICANT CHANGE UP
CREAT ?TM UR-MCNC: 32 MG/DL — SIGNIFICANT CHANGE UP
EOSINOPHIL # BLD AUTO: 0.16 K/UL — SIGNIFICANT CHANGE UP (ref 0–0.7)
EOSINOPHIL NFR BLD AUTO: 1.5 % — SIGNIFICANT CHANGE UP (ref 0–8)
FENTANYL UR QL: NEGATIVE — SIGNIFICANT CHANGE UP
HCT VFR BLD CALC: 38.1 % — SIGNIFICANT CHANGE UP (ref 37–47)
HGB BLD-MCNC: 12.5 G/DL — SIGNIFICANT CHANGE UP (ref 12–16)
HIV 1+2 AB+HIV1 P24 AG SERPL QL IA: SIGNIFICANT CHANGE UP
IMM GRANULOCYTES NFR BLD AUTO: 0.4 % — HIGH (ref 0.1–0.3)
L&D DRUG SCREEN, URINE: SIGNIFICANT CHANGE UP
LYMPHOCYTES # BLD AUTO: 2.48 K/UL — SIGNIFICANT CHANGE UP (ref 1.2–3.4)
LYMPHOCYTES # BLD AUTO: 23.2 % — SIGNIFICANT CHANGE UP (ref 20.5–51.1)
MCHC RBC-ENTMCNC: 28.3 PG — SIGNIFICANT CHANGE UP (ref 27–31)
MCHC RBC-ENTMCNC: 32.8 G/DL — SIGNIFICANT CHANGE UP (ref 32–37)
MCV RBC AUTO: 86.2 FL — SIGNIFICANT CHANGE UP (ref 81–99)
METHADONE UR-MCNC: NEGATIVE — SIGNIFICANT CHANGE UP
MONOCYTES # BLD AUTO: 0.66 K/UL — HIGH (ref 0.1–0.6)
MONOCYTES NFR BLD AUTO: 6.2 % — SIGNIFICANT CHANGE UP (ref 1.7–9.3)
NEUTROPHILS # BLD AUTO: 7.29 K/UL — HIGH (ref 1.4–6.5)
NEUTROPHILS NFR BLD AUTO: 68.2 % — SIGNIFICANT CHANGE UP (ref 42.2–75.2)
NRBC # BLD: 0 /100 WBCS — SIGNIFICANT CHANGE UP (ref 0–0)
OPIATES UR-MCNC: NEGATIVE — SIGNIFICANT CHANGE UP
OXYCODONE UR-MCNC: NEGATIVE — SIGNIFICANT CHANGE UP
PCP UR-MCNC: NEGATIVE — SIGNIFICANT CHANGE UP
PLATELET # BLD AUTO: 219 K/UL — SIGNIFICANT CHANGE UP (ref 130–400)
PROPOXYPHENE QUALITATIVE URINE RESULT: NEGATIVE — SIGNIFICANT CHANGE UP
PROT ?TM UR-MCNC: 5 MG/DLG/24H — SIGNIFICANT CHANGE UP
PROT/CREAT UR-RTO: 0.2 RATIO — SIGNIFICANT CHANGE UP (ref 0–0.2)
RBC # BLD: 4.42 M/UL — SIGNIFICANT CHANGE UP (ref 4.2–5.4)
RBC # FLD: 14.7 % — HIGH (ref 11.5–14.5)
WBC # BLD: 10.68 K/UL — SIGNIFICANT CHANGE UP (ref 4.8–10.8)
WBC # FLD AUTO: 10.68 K/UL — SIGNIFICANT CHANGE UP (ref 4.8–10.8)

## 2019-11-14 RX ORDER — OXYTOCIN 10 UNIT/ML
1 VIAL (ML) INJECTION
Qty: 30 | Refills: 0 | Status: DISCONTINUED | OUTPATIENT
Start: 2019-11-14 | End: 2019-11-14

## 2019-11-14 RX ORDER — DIPHENHYDRAMINE HCL 50 MG
25 CAPSULE ORAL ONCE
Refills: 0 | Status: COMPLETED | OUTPATIENT
Start: 2019-11-14 | End: 2019-11-14

## 2019-11-14 RX ORDER — BUTORPHANOL TARTRATE 2 MG/ML
2 INJECTION, SOLUTION INTRAMUSCULAR; INTRAVENOUS ONCE
Refills: 0 | Status: DISCONTINUED | OUTPATIENT
Start: 2019-11-14 | End: 2019-11-14

## 2019-11-14 RX ORDER — OXYTOCIN 10 UNIT/ML
2 VIAL (ML) INJECTION
Qty: 30 | Refills: 0 | Status: DISCONTINUED | OUTPATIENT
Start: 2019-11-14 | End: 2019-11-15

## 2019-11-14 RX ADMIN — SODIUM CHLORIDE 125 MILLILITER(S): 9 INJECTION, SOLUTION INTRAVENOUS at 22:13

## 2019-11-14 RX ADMIN — BUTORPHANOL TARTRATE 2 MILLIGRAM(S): 2 INJECTION, SOLUTION INTRAMUSCULAR; INTRAVENOUS at 07:59

## 2019-11-14 RX ADMIN — BUTORPHANOL TARTRATE 2 MILLIGRAM(S): 2 INJECTION, SOLUTION INTRAMUSCULAR; INTRAVENOUS at 08:15

## 2019-11-14 RX ADMIN — Medication 2 MILLIUNIT(S)/MIN: at 22:13

## 2019-11-14 RX ADMIN — Medication 25 MILLIGRAM(S): at 08:05

## 2019-11-14 NOTE — PROGRESS NOTE ADULT - ASSESSMENT
A/P:  33yo  @39w3d, GBS neg, with gHTN, BPs currently well controlled, s/p cervidil, with cervical balloon placed for elective IOL at term  -cont EFM/toco  -clear liquid diet, IVF  -pain management prn  -f/u HIV  -continue with cervical balloon 80/80 for induction   -BPs q15m    Dr. Avila and Dr. Heredia to be made aware.

## 2019-11-14 NOTE — PROGRESS NOTE ADULT - ASSESSMENT
A/P: 34yo  at 39w3d GA, Rh positive, GBS negative, h/o shortened interval between gestations, h/o gHTN in prior gestation, obesity, for elective IOL, cervidil currently in place   -continue current management   -pain management prn   -continous EFM and toco   -f/u pending labs     Dr. Rosario aware and Dr. Escobedo to be made aware A/P: 34yo  at 39w4d GA, Rh positive, GBS negative, h/o shortened interval between gestations, h/o gHTN in prior gestation, obesity, for elective IOL, cervidil currently in place   -continue current management   -pain management prn   -continous EFM and toco   -f/u pending labs     Dr. Rosario aware and Dr. Escobedo to be made aware A/P: 34yo  at 39w3d GA, Rh positive, GBS negative, h/o shortened interval between gestations, h/o gHTN in prior gestation, with elevated blood pressures on admission- r/o gHTN vs pre-eclampsia, obesity, for elective IOL, cervidil currently in place   -continue current management   -pain management prn   -continous EFM and toco   -f/u pending labs     Dr. Rosario aware and Dr. Escobedo to be made aware A/P: 34yo  at 39w3d GA, Rh positive, GBS negative, h/o shortened interval between gestations, h/o gHTN in prior gestation, with elevated blood pressures on admission- r/o gHTN vs pre-eclampsia, obesity, for elective IOL, cervidil currently in place   -continue current management   -pain management prn   -continous EFM and toco   -f/u pending labs     Dr. Rosario aware and Dr. Heredia to be made aware A/P: 35yo  at 39w3d GA, Rh positive, GBS negative, h/o shortened interval between gestations, h/o gHTN in prior gestation, with elevated blood pressures on admission- r/o gHTN vs pre-eclampsia, obesity, for elective IOL, cervidil currently in place   -continue current management   -pain management prn   -continous EFM and toco   -f/u pending labs     Dr. Rosario aware and Dr. Heredia to be made aware

## 2019-11-14 NOTE — PROGRESS NOTE ADULT - SUBJECTIVE AND OBJECTIVE BOX
PGY 1 Note    Patient seen at bedside for evaluation of labor progression.  Currently feels contractions but does not desire pain management at this time.  S/p cervidil.  Cervical balloon 80/80 placed at 0600. Denies headache, changes in vision, chest pain, SOB, RUQ/epigastric pain, N/V, fevers, chills, diarrhea, LE pain/swelling.    Vital Signs Last 24 Hrs  T(C): 36.6 (2019 06:03), Max: 36.7 (2019 21:10)  T(F): 97.88 (2019 06:03), Max: 98.1 (2019 21:10)  HR: 81 (2019 11:32) (72 - 99)  BP: 129/82 (2019 12:02) (98/54 - 151/70) last elevated 141/88 @0503  RR: 16 (2019 21:10) (16 - 16)      EFM: 140/mod/+accel, cat I  TOCO: irregular  SVE: 1/0/-3 @0600     Labs:                        12.5   10.68 )-----------( 219      ( 2019 00:00 )             38.1         136  |  102  |  11  ----------------------------<  83  3.8   |  17  |  0.6<L>    Ca    9.5      2019 22:26    TPro  6.7  /  Alb  4.0  /  TBili  <0.2  /  DBili  x   /  AST  21  /  ALT  13  /  AlkPhos  176<H>      ABO RH Interpretation: O POS (19 @ 21:15)  antibody neg  Protein/Creatinine Ratio, Urine (19 @ 00:00)    Protein/Creatinine Ratio Calculation: 0.2 Ratio          Urinalysis Basic - ( 2019 21:15 )    Color: Yellow / Appearance: Slightly Turbid / S.030 / pH: x  Gluc: x / Ketone: Small  / Bili: Negative / Urobili: <2 mg/dL   Blood: x / Protein: 30 mg/dL / Nitrite: Negative   Leuk Esterase: Small / RBC: 2 /HPF / WBC 16 /HPF   Sq Epi: x / Non Sq Epi: 15 /HPF / Bacteria: Many      Meds: citric acid/sodium citrate Solution 15 milliLiter(s) Oral every 6 hours  butorphanol Injectable 2 milliGRAM(s) IV Push once, @0805  dinoprostone Insert 10 milliGRAM(s) Vaginal once, out @0545  diphenhydrAMINE   Injectable 25 milliGRAM(s) IV Push once, @0805

## 2019-11-14 NOTE — PROGRESS NOTE ADULT - ASSESSMENT
A/P: 34yo  at 39w3d GA, Rh positive, GBS negative, h/o shortened interval between gestations, with gHTN, BPs currently normal, asymptomatic, for elective IOL, s/p cervidil, s/p balloon.  -start pitocin @  -pain management prn  -cont efm/toco  -cont to monitor vitals  -cont iv hydration    Dr. Avila and Dr. Mensah aware.

## 2019-11-14 NOTE — PROGRESS NOTE ADULT - SUBJECTIVE AND OBJECTIVE BOX
PGY1 Note    Patient seen at bedside. No complaints at the moment.    T(F): 98.06 ( @ 23:03), Max: 98.1 ( @ 21:10)  HR: 79 ( @ 01:02)  BP: 107/58 ( @ 01:02) (98/54 - 151/70)  RR: 16 ( @ 21:10)    EFM: 140bpm/moderate variability/+accels   TOCO: irregular   SVE: deferred, cervidil in place     Medications:  citric acid/sodium citrate Solution: 15 ( @ 22:56)  dinoprostone Insert: 10 ( @ 22:45)      Labs:                        12.5   10.68 )-----------( 219      ( 2019 00:00 )             38.1         136  |  102  |  11  ----------------------------<  83  3.8   |  17  |  0.6<L>    Ca    9.5      2019 22:26    TPro  6.7  /  Alb  4.0  /  TBili  <0.2  /  DBili  x   /  AST  21  /  ALT  13  /  AlkPhos  176<H>      Prenatal Syphilis Test: Nonreact ( @ 21:15)  Antibody Screen: NEG (19 @ 21:15)    Urinalysis Basic - ( 2019 21:15 )    Color: Yellow / Appearance: Slightly Turbid / S.030 / pH: x  Gluc: x / Ketone: Small  / Bili: Negative / Urobili: <2 mg/dL   Blood: x / Protein: 30 mg/dL / Nitrite: Negative   Leuk Esterase: Small / RBC: 2 /HPF / WBC 16 /HPF   Sq Epi: x / Non Sq Epi: 15 /HPF / Bacteria: Many

## 2019-11-14 NOTE — PROGRESS NOTE ADULT - ASSESSMENT
A/P: 34yo  at 39w3d GA, Rh positive, GBS negative, h/o shortened interval between gestations, h/o gHTN in prior gestation, with elevated blood pressures on admission- r/o gHTN vs pre-eclampsia, obesity, for elective IOL, s/p cervidil, killian balloon placed 80/80  -continue current management   -pain management prn   -continous EFM and toco   -f/u pending labs     Dr. Rosario aware and Dr. Heredia to be made aware A/P: 33yo  at 39w3d GA, Rh positive, GBS negative, h/o shortened interval between gestations, h/o gHTN in prior gestation, with elevated blood pressures on admission- r/o gHTN vs pre-eclampsia, obesity, for elective IOL, s/p cervidil, killian balloon placed 80/80  -continue current management   -pain management prn   -continous EFM and toco   -f/u pending labs     Dr. Rosario aware and Dr. Heredia to be made aware

## 2019-11-14 NOTE — PROGRESS NOTE ADULT - SUBJECTIVE AND OBJECTIVE BOX
PGY1 Note    Patient seen at bedside. No complaints at the moment.    T(F): 97.88 ( @ 02:37), Max: 98.1 ( @ 21:10)  HR: 81 ( @ 06:03)  BP: 128/77 ( @ 05:32) (98/54 - 151/70)  RR: 16 ( @ 21:10)    EFM: 130bpm/moderate variability/+accels  TOCO: q3-5mins   SVE: /-3, vtx, intact; killian balloon placed 80/80    Medications:  citric acid/sodium citrate Solution: 15 ( @ 22:56)  dinoprostone Insert: 10 ( @ 22:45)      Labs:                        12.5   10.68 )-----------( 219      ( 2019 00:00 )             38.1         136  |  102  |  11  ----------------------------<  83  3.8   |  17  |  0.6<L>    Ca    9.5      2019 22:26    TPro  6.7  /  Alb  4.0  /  TBili  <0.2  /  DBili  x   /  AST  21  /  ALT  13  /  AlkPhos  176<H>      Prenatal Syphilis Test: Nonreact ( @ 21:15)  Antibody Screen: NEG (19 @ 21:15)    Urinalysis Basic - ( 2019 21:15 )    Color: Yellow / Appearance: Slightly Turbid / S.030 / pH: x  Gluc: x / Ketone: Small  / Bili: Negative / Urobili: <2 mg/dL   Blood: x / Protein: 30 mg/dL / Nitrite: Negative   Leuk Esterase: Small / RBC: 2 /HPF / WBC 16 /HPF   Sq Epi: x / Non Sq Epi: 15 /HPF / Bacteria: Many

## 2019-11-14 NOTE — PROGRESS NOTE ADULT - SUBJECTIVE AND OBJECTIVE BOX
PGY1 Note    Patient seen at bedside for evaluation of labor progression, doing well, no complaints. Avendano balloon removed @1800.    T(F): 97.88 (19 @ 06:03), Max: 98.1 (19 @ 21:10)  HR: 84 (19 @ 18:02) (72 - 99)  BP: 129/78 (19 @ 18:02) (98/54 - 151/70)  RR: 16 (19 @ 21:10) (16 - 16)    EFM: 140/mod/+accels; cat 1  TOCO: occasional  SVE: 50/-3, vtx intact    Medications:  ([place]): 1 Each (19 @ 07:49)  (Floorstock): 1 Each (19 @ 07:49)  (Floorstock): 1 Each (19 @ 10:24)  butorphanol Injectable: 2 milliGRAM(s) (19 @ 07:59)  citric acid/sodium citrate Solution: 15 milliLiter(s) (19 @ 22:56)  dinoprostone Insert: 10 milliGRAM(s) (19 @ 22:45)  diphenhydrAMINE   Injectable: 25 milliGRAM(s) (19 @ 08:05)      Labs:                        12.5   10.68 )-----------( 219      ( 2019 00:00 )             38.1         136  |  102  |  11  ----------------------------<  83  3.8   |  17  |  0.6<L>    Ca    9.5      2019 22:26    TPro  6.7  /  Alb  4.0  /  TBili  <0.2  /  DBili  x   /  AST  21  /  ALT  13  /  AlkPhos  176<H>      ABO RH Interpretation: O POS (19 @ 21:15)    Antibody Screen: NEG (19 @ 21:15)    Urinalysis Basic - ( 2019 21:15 )    Color: Yellow / Appearance: Slightly Turbid / S.030 / pH: x  Gluc: x / Ketone: Small  / Bili: Negative / Urobili: <2 mg/dL   Blood: x / Protein: 30 mg/dL / Nitrite: Negative   Leuk Esterase: Small / RBC: 2 /HPF / WBC 16 /HPF   Sq Epi: x / Non Sq Epi: 15 /HPF / Bacteria: Many      L&amp;D Drug Screen, Urine: Done (19 @ 21:15)    Prenatal Syphilis Test: Nonreact (19 @ 21:15)      Lactate Dehydrogenase, Serum: 204 (19 @ 22:26)    Protein/Creatinine Ratio Calculation: 0.2 Ratio (19 @ 00:00)

## 2019-11-14 NOTE — PROGRESS NOTE ADULT - ASSESSMENT
A/P: 32yo  at 39w3d GA, Rh positive, GBS negative, h/o shortened interval between gestations, h/o gHTN in prior gestation, with elevated blood pressures on admission- r/o gHTN vs pre-eclampsia, obesity, for elective IOL, cervidil currently in place   -continue current management   -pain management prn   -continous EFM and toco   -f/u pending labs   -cervidil to be removed at 1145    Dr. Rosario aware and Dr. Heredia to be made aware A/P: 34yo  at 39w3d GA, Rh positive, GBS negative, h/o shortened interval between gestations, h/o gHTN in prior gestation, with elevated blood pressures on admission- r/o gHTN vs pre-eclampsia, obesity, for elective IOL, cervidil currently in place   -continue current management   -pain management prn   -continous EFM and toco   -f/u pending labs     Dr. Rosario aware and Dr. Heredia to be made aware A/P: 32yo  at 39w3d GA, Rh positive, GBS negative, h/o shortened interval between gestations, h/o gHTN in prior gestation, with elevated blood pressures on admission- r/o gHTN vs pre-eclampsia, obesity, for elective IOL, s/p cervidil   -continue current management   -pain management prn   -continous EFM and toco   -f/u pending labs     Dr. Rosario aware and Dr. Heredia to be made aware A/P: 35yo  at 39w3d GA, Rh positive, GBS negative, h/o shortened interval between gestations, h/o gHTN in prior gestation, with elevated blood pressures on admission- r/o gHTN vs pre-eclampsia, obesity, for elective IOL, s/p cervidil   -continue current management   -pain management prn   -continous EFM and toco   -f/u pending labs     Dr. Rosario aware and Dr. Heredia to be made aware

## 2019-11-14 NOTE — PROGRESS NOTE ADULT - SUBJECTIVE AND OBJECTIVE BOX
PGY1 Note    Patient seen at bedside. No complaints at the moment.    T(F): 97.88 ( @ 02:37), Max: 98.1 ( @ 21:10)  HR: 86 ( @ 05:03)  BP: 141/88 ( @ 05:03) (98/54 - 151/70)  RR: 16 ( @ 21:10)    EFM: 130bpm/moderate variability/+accels   TOCO: q3-5mins   SVE: deferred, cervidil in place     Medications:  citric acid/sodium citrate Solution: 15 ( @ 22:56)  dinoprostone Insert: 10 ( @ 22:45)      Labs:                        12.5   10.68 )-----------( 219      ( 2019 00:00 )             38.1         136  |  102  |  11  ----------------------------<  83  3.8   |  17  |  0.6<L>    Ca    9.5      2019 22:26    TPro  6.7  /  Alb  4.0  /  TBili  <0.2  /  DBili  x   /  AST  21  /  ALT  13  /  AlkPhos  176<H>      Prenatal Syphilis Test: Nonreact ( @ 21:15)  Antibody Screen: NEG (19 @ 21:15)    Urinalysis Basic - ( 2019 21:15 )    Color: Yellow / Appearance: Slightly Turbid / S.030 / pH: x  Gluc: x / Ketone: Small  / Bili: Negative / Urobili: <2 mg/dL   Blood: x / Protein: 30 mg/dL / Nitrite: Negative   Leuk Esterase: Small / RBC: 2 /HPF / WBC 16 /HPF   Sq Epi: x / Non Sq Epi: 15 /HPF / Bacteria: Many PGY1 Note    Patient seen at bedside. Pt states that her cervidil fell out while she was using the restroom.     T(F): 97.88 ( @ 02:37), Max: 98.1 ( @ 21:10)  HR: 86 ( @ 05:03)  BP: 141/88 ( @ 05:03) (98/54 - 151/70)  RR: 16 ( @ 21:10)    EFM: 130bpm/moderate variability/+accels   TOCO: q3-5mins   SVE: /-3, vtx, intact     Medications:  citric acid/sodium citrate Solution: 15 ( @ 22:56)  dinoprostone Insert: 10 ( @ 22:45)      Labs:                        12.5   10.68 )-----------( 219      ( 2019 00:00 )             38.1         136  |  102  |  11  ----------------------------<  83  3.8   |  17  |  0.6<L>    Ca    9.5      2019 22:26    TPro  6.7  /  Alb  4.0  /  TBili  <0.2  /  DBili  x   /  AST  21  /  ALT  13  /  AlkPhos  176<H>      Prenatal Syphilis Test: Nonreact ( @ 21:15)  Antibody Screen: NEG (19 @ 21:15)    Urinalysis Basic - ( 2019 21:15 )    Color: Yellow / Appearance: Slightly Turbid / S.030 / pH: x  Gluc: x / Ketone: Small  / Bili: Negative / Urobili: <2 mg/dL   Blood: x / Protein: 30 mg/dL / Nitrite: Negative   Leuk Esterase: Small / RBC: 2 /HPF / WBC 16 /HPF   Sq Epi: x / Non Sq Epi: 15 /HPF / Bacteria: Many

## 2019-11-15 RX ORDER — HYDROCORTISONE 1 %
1 OINTMENT (GRAM) TOPICAL EVERY 6 HOURS
Refills: 0 | Status: DISCONTINUED | OUTPATIENT
Start: 2019-11-15 | End: 2019-11-17

## 2019-11-15 RX ORDER — ACETAMINOPHEN 500 MG
975 TABLET ORAL
Refills: 0 | Status: DISCONTINUED | OUTPATIENT
Start: 2019-11-15 | End: 2019-11-17

## 2019-11-15 RX ORDER — LANOLIN
1 OINTMENT (GRAM) TOPICAL EVERY 6 HOURS
Refills: 0 | Status: DISCONTINUED | OUTPATIENT
Start: 2019-11-15 | End: 2019-11-17

## 2019-11-15 RX ORDER — BUPIVACAINE HCL/PF 7.5 MG/ML
200 VIAL (ML) INJECTION
Refills: 0 | Status: CANCELLED | OUTPATIENT
Start: 2019-11-15 | End: 2019-11-17

## 2019-11-15 RX ORDER — AER TRAVELER 0.5 G/1
1 SOLUTION RECTAL; TOPICAL EVERY 4 HOURS
Refills: 0 | Status: DISCONTINUED | OUTPATIENT
Start: 2019-11-15 | End: 2019-11-17

## 2019-11-15 RX ORDER — GLYCERIN ADULT
1 SUPPOSITORY, RECTAL RECTAL AT BEDTIME
Refills: 0 | Status: DISCONTINUED | OUTPATIENT
Start: 2019-11-15 | End: 2019-11-17

## 2019-11-15 RX ORDER — OXYCODONE HYDROCHLORIDE 5 MG/1
5 TABLET ORAL ONCE
Refills: 0 | Status: DISCONTINUED | OUTPATIENT
Start: 2019-11-15 | End: 2019-11-17

## 2019-11-15 RX ORDER — SODIUM CHLORIDE 9 MG/ML
3 INJECTION INTRAMUSCULAR; INTRAVENOUS; SUBCUTANEOUS EVERY 8 HOURS
Refills: 0 | Status: DISCONTINUED | OUTPATIENT
Start: 2019-11-15 | End: 2019-11-17

## 2019-11-15 RX ORDER — KETOROLAC TROMETHAMINE 30 MG/ML
30 SYRINGE (ML) INJECTION ONCE
Refills: 0 | Status: DISCONTINUED | OUTPATIENT
Start: 2019-11-15 | End: 2019-11-15

## 2019-11-15 RX ORDER — ONDANSETRON 8 MG/1
4 TABLET, FILM COATED ORAL EVERY 6 HOURS
Refills: 0 | Status: CANCELLED | OUTPATIENT
Start: 2019-11-15 | End: 2019-11-17

## 2019-11-15 RX ORDER — NALOXONE HYDROCHLORIDE 4 MG/.1ML
0.1 SPRAY NASAL
Refills: 0 | Status: CANCELLED | OUTPATIENT
Start: 2019-11-15 | End: 2019-11-17

## 2019-11-15 RX ORDER — OXYTOCIN 10 UNIT/ML
2 VIAL (ML) INJECTION
Qty: 30 | Refills: 0 | Status: DISCONTINUED | OUTPATIENT
Start: 2019-11-15 | End: 2019-11-17

## 2019-11-15 RX ORDER — MAGNESIUM HYDROXIDE 400 MG/1
30 TABLET, CHEWABLE ORAL
Refills: 0 | Status: DISCONTINUED | OUTPATIENT
Start: 2019-11-15 | End: 2019-11-17

## 2019-11-15 RX ORDER — PRAMOXINE HYDROCHLORIDE 150 MG/15G
1 AEROSOL, FOAM RECTAL EVERY 4 HOURS
Refills: 0 | Status: DISCONTINUED | OUTPATIENT
Start: 2019-11-15 | End: 2019-11-17

## 2019-11-15 RX ORDER — DINOPROSTONE 10 MG/241MG
10 INSERT VAGINAL ONCE
Refills: 0 | Status: COMPLETED | OUTPATIENT
Start: 2019-11-15 | End: 2019-11-15

## 2019-11-15 RX ORDER — OXYCODONE HYDROCHLORIDE 5 MG/1
5 TABLET ORAL
Refills: 0 | Status: DISCONTINUED | OUTPATIENT
Start: 2019-11-15 | End: 2019-11-17

## 2019-11-15 RX ORDER — IBUPROFEN 200 MG
600 TABLET ORAL EVERY 6 HOURS
Refills: 0 | Status: COMPLETED | OUTPATIENT
Start: 2019-11-15 | End: 2020-10-13

## 2019-11-15 RX ORDER — DIPHENHYDRAMINE HCL 50 MG
25 CAPSULE ORAL EVERY 6 HOURS
Refills: 0 | Status: DISCONTINUED | OUTPATIENT
Start: 2019-11-15 | End: 2019-11-17

## 2019-11-15 RX ORDER — DEXAMETHASONE 0.5 MG/5ML
4 ELIXIR ORAL EVERY 6 HOURS
Refills: 0 | Status: CANCELLED | OUTPATIENT
Start: 2019-11-15 | End: 2019-11-17

## 2019-11-15 RX ORDER — SIMETHICONE 80 MG/1
80 TABLET, CHEWABLE ORAL EVERY 4 HOURS
Refills: 0 | Status: DISCONTINUED | OUTPATIENT
Start: 2019-11-15 | End: 2019-11-17

## 2019-11-15 RX ORDER — DIBUCAINE 1 %
1 OINTMENT (GRAM) RECTAL EVERY 6 HOURS
Refills: 0 | Status: DISCONTINUED | OUTPATIENT
Start: 2019-11-15 | End: 2019-11-17

## 2019-11-15 RX ORDER — BENZOCAINE 10 %
1 GEL (GRAM) MUCOUS MEMBRANE EVERY 6 HOURS
Refills: 0 | Status: DISCONTINUED | OUTPATIENT
Start: 2019-11-15 | End: 2019-11-17

## 2019-11-15 RX ADMIN — Medication 15 MILLILITER(S): at 01:21

## 2019-11-15 RX ADMIN — Medication 2 MILLIUNIT(S)/MIN: at 16:00

## 2019-11-15 RX ADMIN — Medication 15 MILLILITER(S): at 18:28

## 2019-11-15 RX ADMIN — SODIUM CHLORIDE 125 MILLILITER(S): 9 INJECTION, SOLUTION INTRAVENOUS at 12:45

## 2019-11-15 RX ADMIN — Medication 30 MILLIGRAM(S): at 22:15

## 2019-11-15 RX ADMIN — Medication 15 MILLILITER(S): at 12:44

## 2019-11-15 RX ADMIN — DINOPROSTONE 10 MILLIGRAM(S): 10 INSERT VAGINAL at 01:18

## 2019-11-15 NOTE — PROGRESS NOTE ADULT - ASSESSMENT
32 y/o  at 39w5d GA, GBS negative, IOL for gHTN, s/p cervidil x 2 and cervical balloon, on pitocin, s/p AROM, in active labor  -Continue current management   -Pain management prn  -Continuous EFM/toco  -F/u pending labs  -Reevaluate     Dr. Rosario and Dr. Heredia aware.

## 2019-11-15 NOTE — PROGRESS NOTE ADULT - ASSESSMENT
A/P: 35yo  at 39w3d GA, Rh positive, GBS negative, h/o shortened interval between gestations, with gHTN, BPs currently normal, asymptomatic, for elective IOL, s/p cervidil #1, s/p balloon, pitocin discontinued and cervidil#2 placed  -pitocin discontinued at 0036  -pain management prn  -cont efm/toco  -cont to monitor vitals  -cont iv hydration    Dr. Rosario and Dr. Mensah aware.

## 2019-11-15 NOTE — PROGRESS NOTE ADULT - SUBJECTIVE AND OBJECTIVE BOX
PGY1 Note    Patient seen at bedside. No complaints at the moment.    T(F): 98.6 (11-15 @ 04:44), Max: 98.96 ( @ 19:32)  HR: 95 (11-15 @ 05:14)  BP: 124/79 (11-15 @ 05:14) (109/68 - 142/77)  RR: 18 (11-15 @ 04:44)    EFM: 13bpm/moderate variability/+accels  TOCO: [id number]   SVE: deferred, cervidil #2 in place    Medications:  butorphanol Injectable: 2 ( @ 07:59)  citric acid/sodium citrate Solution: 15 (11-15 @ 01:21),  15 ( @ 22:56)  dinoprostone Insert: 10 ( @ 22:45)  dinoprostone Insert: 10 (11-15 @ 01:18)  diphenhydrAMINE   Injectable: 25 ( @ 08:05)  lactated ringers.: 125 ( @ 21:13)  oxytocin Infusion: 2 ( @ 21:26)      Labs:                        12.5   10.68 )-----------( 219      ( 2019 00:00 )             38.1         136  |  102  |  11  ----------------------------<  83  3.8   |  17  |  0.6<L>    Ca    9.5      2019 22:26    TPro  6.7  /  Alb  4.0  /  TBili  <0.2  /  DBili  x   /  AST  21  /  ALT  13  /  AlkPhos  176<H>  11      Urinalysis Basic - ( 2019 21:15 )    Color: Yellow / Appearance: Slightly Turbid / S.030 / pH: x  Gluc: x / Ketone: Small  / Bili: Negative / Urobili: <2 mg/dL   Blood: x / Protein: 30 mg/dL / Nitrite: Negative   Leuk Esterase: Small / RBC: 2 /HPF / WBC 16 /HPF   Sq Epi: x / Non Sq Epi: 15 /HPF / Bacteria: Many

## 2019-11-15 NOTE — PROGRESS NOTE ADULT - ASSESSMENT
A/P: 33yo  at 39w3d GA, Rh positive, GBS negative, h/o shortened interval between gestations, with gHTN, BPs currently normal, asymptomatic, for elective IOL, s/p cervidil, s/p balloon, pitocin discontinued and cervidil#2 placed  -start pitocin @  -pain management prn  -cont efm/toco  -cont to monitor vitals  -cont iv hydration    Dr. Rosario and Dr. Mensah aware. A/P: 35yo  at 39w3d GA, Rh positive, GBS negative, h/o shortened interval between gestations, with gHTN, BPs currently normal, asymptomatic, for elective IOL, s/p cervidil #1, s/p balloon, pitocin discontinued and cervidil#2 placed  -start pitocin @  -pain management prn  -cont efm/toco  -cont to monitor vitals  -cont iv hydration    Dr. Rosario and Dr. Mensah aware. A/P: 33yo  at 39w3d GA, Rh positive, GBS negative, h/o shortened interval between gestations, with gHTN, BPs currently normal, asymptomatic, for elective IOL, s/p cervidil #1, s/p balloon, pitocin discontinued and cervidil#2 placed  -pitocin discontinued at 0036  -pain management prn  -cont efm/toco  -cont to monitor vitals  -cont iv hydration    Dr. Rosario and Dr. Mensah aware.

## 2019-11-15 NOTE — PROCEDURAL SAFETY CHECKLIST WITH OR WITHOUT SEDATION - NSPX2BRECORDED_GEN_ALL_CORE
Final Anesthesia Post-op Assessment    Patient: Marco Suarez  Procedure(s) Performed: LEFT SHOULDER ARTHROSCOPY, ANTERIOR LABRAL REPAIR, POSTERIOR LABRAL REPAIR, BANKART REPAIR, SUPERIOR LABRAL ANTERIOR POSTERIOR REPAIR  Anesthesia type: General    Vital Last Value   Temperature 37.1 °C (98.8 °F) (12/12/17 1722)   Pulse 80 (12/12/17 1807)   Respiratory Rate 17 (12/12/17 1807)   Non-Invasive   Blood Pressure 142/65 (12/12/17 1807)   Arterial  Blood Pressure     Pulse Oximetry 95 % (12/12/17 1807)     Last 24 I/O:   Intake/Output Summary (Last 24 hours) at 12/12/17 1819  Last data filed at 12/12/17 1741   Gross per 24 hour   Intake             1450 ml   Output                0 ml   Net             1450 ml       PATIENT LOCATION: PACU Phase 2  LEVEL OF CONSCIOUSNESS: participates in exam, answers questions appropriately, awake and alert  RESPIRATORY STATUS: BIPAP  CARDIOVASCULAR: blood pressure returned to baseline  HYDRATION: euvolemic    PAIN MANAGEMENT: well controlled  NAUSEA: None  AIRWAY PATENCY: patent  POST-OP ASSESSMENT: sufficiently recovered from acute administration of anesthesia effects and able to participate in evaluation  COMPLICATIONS: respiratory event    Pt became apneic after being in the PAR for 5-10 minutes.  Later required BIPAP to maintain SaO2's over 90%; upper 90's on 40% BIPAP; desaturated to low to mid 80's on O2 face mask.  Pulmonary MD managing pt; will be kept overnight on BIPAP for apparent flash pulmonary edema.  Pt alert, cooperative; will be seen tomorrow my anesthesia dept.     Regional anesthesia/ epidural

## 2019-11-15 NOTE — PROGRESS NOTE ADULT - SUBJECTIVE AND OBJECTIVE BOX
PGY1 Note    Patient seen at bedside. No complaints at the moment.    T(F): 98.96 ( @ 19:32), Max: 98.96 ( @ 19:32)  HR: 88 (11-15 @ 01:14)  BP: 134/91 (11-15 @ 01:14) (111/62 - 142/77)  RR: 18 ( @ 23:44)    EFM: 135bpm/moderate variability/+accels  TOCO: irregular  SVE: 2/L/-3, vtx, intact; cervidil #2 placed per Dr. Mensah    Medications:  butorphanol Injectable: 2 ( @ 07:59)  citric acid/sodium citrate Solution: 15 ( @ 22:56)  dinoprostone Insert: 10 ( @ 22:45)  dinoprostone Insert: 10 (11-15 @ 01:18)  diphenhydrAMINE   Injectable: 25 ( @ 08:05)  lactated ringers.: 125 ( @ 21:13)  oxytocin Infusion: 2 ( @ 21:26)      Labs:                        12.5   10.68 )-----------( 219      ( 2019 00:00 )             38.1         136  |  102  |  11  ----------------------------<  83  3.8   |  17  |  0.6<L>    Ca    9.5      2019 22:26    TPro  6.7  /  Alb  4.0  /  TBili  <0.2  /  DBili  x   /  AST  21  /  ALT  13  /  AlkPhos  176<H>        Urinalysis Basic - ( 2019 21:15 )    Color: Yellow / Appearance: Slightly Turbid / S.030 / pH: x  Gluc: x / Ketone: Small  / Bili: Negative / Urobili: <2 mg/dL   Blood: x / Protein: 30 mg/dL / Nitrite: Negative   Leuk Esterase: Small / RBC: 2 /HPF / WBC 16 /HPF   Sq Epi: x / Non Sq Epi: 15 /HPF / Bacteria: Many

## 2019-11-15 NOTE — PROGRESS NOTE ADULT - ASSESSMENT
32 y/o  at 39w5d GA, GBS negative, IOL for gHTN, s/p cervidil x 2 and cervical balloon, on pitocin, s/p AROM, in active labor.   - continuous EFM and toco  - pain management PRN  - BPs q15 min.   - will reevaluate.    Dr. Heredia at bedside.

## 2019-11-15 NOTE — PROGRESS NOTE ADULT - ASSESSMENT
34 yo  @ 39w4d, GBS neg, IOL s/p cervidil, GHTN  epidural  reexamine  will discuss further plan with Dr. Heredia

## 2019-11-15 NOTE — PROGRESS NOTE ADULT - SUBJECTIVE AND OBJECTIVE BOX
PGY 4 note    Patient seen at bedside and evaluated.  Complaining of contraction pain.    ICU Vital Signs Last 24 Hrs  T(C): 37.0 (15 Nov 2019 04:44), Max: 37.2 (14 Nov 2019 19:32)  T(F): 98.6 (15 Nov 2019 04:44), Max: 98.96 (14 Nov 2019 19:32)  HR: 85 (15 Nov 2019 14:19) (78 - 104)  BP: 136/86 (15 Nov 2019 14:19) (109/62 - 142/77)  BP(mean): --  ABP: --  ABP(mean): --  RR: 18 (15 Nov 2019 05:14) (18 - 18)  SpO2: --    EFM: 150/mod judy/+accels/variable decels  TOCO: q2-4  SVE: 4-5/60/-2, vtx, intact      LABS:                        12.5   10.68 )-----------( 219      ( 14 Nov 2019 00:00 )             38.1       11-13-19 @ 22:26      136  |  102  |  11  ----------------------------<  83  3.8   |  17  |  0.6<L>        Ca    9.5      13 Nov 2019 22:26    TPro  6.7  /  Alb  4.0  /  TBili  <0.2  /  DBili  x   /  AST  21  /  ALT  13  /  AlkPhos  176<H>  11-13-19 @ 22:26    UDS neg  O pos  HIV NR  Uprcr 0.2      Medications  citric acid/sodium citrate Solution   15 milliLiter(s) Oral (11-15-19 @ 01:21)   15 milliLiter(s) Oral (11-15-19 @ 12:44)  s/p cervidil  lactated ringers.   125 mL/Hr IV Continuous (11-14-19 @ 22:13)

## 2019-11-15 NOTE — PROGRESS NOTE ADULT - SUBJECTIVE AND OBJECTIVE BOX
PGY1 Note    Patient seen at bedside. No complaints at the moment.    T(F): 98.6 (11-15 @ 04:44), Max: 98.96 ( @ 19:32)  HR: 89 (11-15 @ 18:27)  BP: 133/84 (11-15 @ 18:24) (109/62 - 158/93)  RR: 18 (11-15 @ 05:14)    EFM: 145bpm/moderate variability/+accels  TOCO: q2-4mins  SVE: 8/90/-2, vtx, ruptured; per Dr. Heredia    Medications:  butorphanol Injectable: 2 ( @ 07:59)  citric acid/sodium citrate Solution: 15 (11-15 @ 18:28),  15 (11-15 @ 12:44),  15 (11-15 @ 01:21),  15 ( @ 22:56)  dinoprostone Insert: 10 ( @ 22:45)  dinoprostone Insert: 10 (11-15 @ 01:18)  diphenhydrAMINE   Injectable: 25 ( @ 08:05)  lactated ringers.: 125 ( @ 22:13),  125 ( @ 21:13)  oxytocin Infusion: 2 (11-15 @ 15:39)  oxytocin Infusion: 2 ( @ 21:26)      Labs:                        12.5   10.68 )-----------( 219      ( 2019 00:00 )             38.1         136  |  102  |  11  ----------------------------<  83  3.8   |  17  |  0.6<L>    Ca    9.5      2019 22:26    TPro  6.7  /  Alb  4.0  /  TBili  <0.2  /  DBili  x   /  AST  21  /  ALT  13  /  AlkPhos  176<H>        Urinalysis Basic - ( 2019 21:15 )    Color: Yellow / Appearance: Slightly Turbid / S.030 / pH: x  Gluc: x / Ketone: Small  / Bili: Negative / Urobili: <2 mg/dL   Blood: x / Protein: 30 mg/dL / Nitrite: Negative   Leuk Esterase: Small / RBC: 2 /HPF / WBC 16 /HPF   Sq Epi: x / Non Sq Epi: 15 /HPF / Bacteria: Many

## 2019-11-15 NOTE — PROGRESS NOTE ADULT - SUBJECTIVE AND OBJECTIVE BOX
PGY3 progress note    Patient seen and examined at bedside, resting comfortably.     Vital Signs Last 24 Hrs  T(C): 37.0 (15 Nov 2019 04:44), Max: 37.2 (2019 19:32)  T(F): 98.6 (15 Nov 2019 04:44), Max: 98.96 (2019 19:32)  HR: 94 (15 Nov 2019 17:02) (78 - 109)  BP: 125/68 (15 Nov 2019 17:02) (109/62 - 142/77)  BP(mean): --  RR: 18 (15 Nov 2019 05:14) (18 - 18)  SpO2: 98% (15 Nov 2019 17:02) (94% - 99%)    EFM: 140/mod/accels+  TOCO: q2-4 min.  SVE: deferred, last exam @2 was 6/60/-3, AROM, clear    Medications:  (Floorstock): 1 Each (19 @ 07:49)  (Floorstock): 1 Each (19 @ 07:49)  (Floorstock): 1 Each (19 @ 10:24)  (Floorstock): 1 Each (11-15-19 @ 15:55)  butorphanol Injectable: 2 milliGRAM(s) (19 @ 07:59)  citric acid/sodium citrate Solution: 15 milliLiter(s) (11-15-19 @ 12:44),  15 milliLiter(s) (11-15-19 @ 01:21),  15 milliLiter(s) (19 @ 22:56)  dinoprostone Insert: 10 milliGRAM(s) (19 @ 22:45)  dinoprostone Insert: 10 milliGRAM(s) (11-15-19 @ 01:18)  diphenhydrAMINE   Injectable: 25 milliGRAM(s) (19 @ 08:05)  lactated ringers.: 125 mL/Hr (19 @ 22:13),  125 mL/Hr (19 @ 21:13)  oxytocin Infusion: 2 mL/Hr (11-15-19 @ 15:39)  oxytocin Infusion: 2 mL/Hr (11-14-19 @ 21:26), now at 6 mU/min      Labs:                        12.5   10.68 )-----------( 219      ( 2019 00:00 )             38.1         136  |  102  |  11  ----------------------------<  83  3.8   |  17  |  0.6<L>    Ca    9.5      2019 22:26    TPro  6.7  /  Alb  4.0  /  TBili  <0.2  /  DBili  x   /  AST  21  /  ALT  13  /  AlkPhos  176<H>  11      Urinalysis Basic - ( 2019 21:15 )    Color: Yellow / Appearance: Slightly Turbid / S.030 / pH: x  Gluc: x / Ketone: Small  / Bili: Negative / Urobili: <2 mg/dL   Blood: x / Protein: 30 mg/dL / Nitrite: Negative   Leuk Esterase: Small / RBC: 2 /HPF / WBC 16 /HPF   Sq Epi: x / Non Sq Epi: 15 /HPF / Bacteria: Many    Upr/cr 0.2

## 2019-11-16 LAB
HCT VFR BLD CALC: 30.4 % — LOW (ref 37–47)
HGB BLD-MCNC: 10 G/DL — LOW (ref 12–16)
MCHC RBC-ENTMCNC: 28.7 PG — SIGNIFICANT CHANGE UP (ref 27–31)
MCHC RBC-ENTMCNC: 32.9 G/DL — SIGNIFICANT CHANGE UP (ref 32–37)
MCV RBC AUTO: 87.1 FL — SIGNIFICANT CHANGE UP (ref 81–99)
NRBC # BLD: 0 /100 WBCS — SIGNIFICANT CHANGE UP (ref 0–0)
PLATELET # BLD AUTO: 168 K/UL — SIGNIFICANT CHANGE UP (ref 130–400)
RBC # BLD: 3.49 M/UL — LOW (ref 4.2–5.4)
RBC # FLD: 15 % — HIGH (ref 11.5–14.5)
WBC # BLD: 10.8 K/UL — SIGNIFICANT CHANGE UP (ref 4.8–10.8)
WBC # FLD AUTO: 10.8 K/UL — SIGNIFICANT CHANGE UP (ref 4.8–10.8)

## 2019-11-16 RX ORDER — IBUPROFEN 200 MG
600 TABLET ORAL EVERY 6 HOURS
Refills: 0 | Status: DISCONTINUED | OUTPATIENT
Start: 2019-11-16 | End: 2019-11-17

## 2019-11-16 RX ADMIN — Medication 600 MILLIGRAM(S): at 11:37

## 2019-11-16 RX ADMIN — SODIUM CHLORIDE 3 MILLILITER(S): 9 INJECTION INTRAMUSCULAR; INTRAVENOUS; SUBCUTANEOUS at 12:51

## 2019-11-16 RX ADMIN — Medication 975 MILLIGRAM(S): at 21:34

## 2019-11-16 RX ADMIN — Medication 1 TABLET(S): at 11:37

## 2019-11-16 RX ADMIN — Medication 600 MILLIGRAM(S): at 06:05

## 2019-11-16 RX ADMIN — Medication 975 MILLIGRAM(S): at 15:26

## 2019-11-16 RX ADMIN — Medication 600 MILLIGRAM(S): at 12:19

## 2019-11-16 RX ADMIN — Medication 600 MILLIGRAM(S): at 17:23

## 2019-11-16 RX ADMIN — Medication 975 MILLIGRAM(S): at 08:30

## 2019-11-16 RX ADMIN — Medication 975 MILLIGRAM(S): at 16:00

## 2019-11-16 RX ADMIN — Medication 975 MILLIGRAM(S): at 09:00

## 2019-11-16 RX ADMIN — Medication 600 MILLIGRAM(S): at 18:00

## 2019-11-16 RX ADMIN — SODIUM CHLORIDE 3 MILLILITER(S): 9 INJECTION INTRAMUSCULAR; INTRAVENOUS; SUBCUTANEOUS at 06:09

## 2019-11-17 VITALS
SYSTOLIC BLOOD PRESSURE: 118 MMHG | DIASTOLIC BLOOD PRESSURE: 62 MMHG | RESPIRATION RATE: 18 BRPM | TEMPERATURE: 97 F | HEART RATE: 79 BPM

## 2019-11-17 RX ORDER — ACETAMINOPHEN 500 MG
3 TABLET ORAL
Qty: 0 | Refills: 0 | DISCHARGE
Start: 2019-11-17

## 2019-11-17 RX ORDER — IBUPROFEN 200 MG
1 TABLET ORAL
Qty: 0 | Refills: 0 | DISCHARGE
Start: 2019-11-17

## 2019-11-17 RX ORDER — SIMETHICONE 80 MG/1
1 TABLET, CHEWABLE ORAL
Qty: 0 | Refills: 0 | DISCHARGE
Start: 2019-11-17

## 2019-11-17 RX ADMIN — Medication 600 MILLIGRAM(S): at 00:36

## 2019-11-17 RX ADMIN — Medication 1 TABLET(S): at 12:24

## 2019-11-17 RX ADMIN — Medication 600 MILLIGRAM(S): at 12:24

## 2019-11-17 RX ADMIN — Medication 975 MILLIGRAM(S): at 08:39

## 2019-11-17 RX ADMIN — Medication 975 MILLIGRAM(S): at 09:10

## 2019-11-17 RX ADMIN — Medication 975 MILLIGRAM(S): at 15:37

## 2019-11-17 RX ADMIN — Medication 600 MILLIGRAM(S): at 13:00

## 2019-11-17 RX ADMIN — Medication 600 MILLIGRAM(S): at 06:07

## 2019-11-17 RX ADMIN — Medication 975 MILLIGRAM(S): at 16:07

## 2019-11-17 NOTE — DISCHARGE NOTE OB - ADDITIONAL INSTRUCTIONS
Nothing in the vagina for 6 weeks (no sex, no tampons, no douching). Avoid tub baths, you may shower.  If you have a fever of 100.4F or greater, severe vaginal bleeding, or severe abdominal pain, call your Ob/Gyn or come to the emergency department immediately.  If you notice a headache that won't go away, changes in vision, chest pain, shortness of breath, abdominal pain, severe leg swelling or pain please call your provider or go to the hospital. Please follow up with your provider in 6 weeks for postpartum visit.

## 2019-11-17 NOTE — DISCHARGE NOTE OB - PLAN OF CARE
Healthy recovery Monitor vitals/labs.  To follow up with provider in 6 weeks for postpartum visit. BPs well controlled, asymptomatic monitor vitals/labs

## 2019-11-17 NOTE — DISCHARGE NOTE OB - PATIENT PORTAL LINK FT
You can access the FollowMyHealth Patient Portal offered by [facility] by registering at the following website: [email or web address]. By joining [employer]’s FollowMyHealth portal, you will also be able to view your health information using other applications (apps) compatible with our system.

## 2019-11-17 NOTE — DISCHARGE NOTE OB - HOSPITAL COURSE
19 @ 10:22    HPI:  34yo  at 39w3d GA dated by LMP 02/10/19 presents to L&D for elective induction at term. Pt reports good fetal movement. Pt denies vaginal bleeding, LOF, or contractions. GBS negative. Denies complications in this pregnancy. H/o gestation hypertension in G1 with manual removal of retained placenta; was supposed to take ASA 81mg but never started taking. it Pt denies HA, vision changes, SOB, cough, N/V/D, epigastric or RUQ pain, erythema or pain in lower extremities. (2019 21:15)      PAST MEDICAL & SURGICAL HISTORY:  No pertinent past medical history  No significant past surgical history      POST PARTUM COURSE: Uncomplicated postpartum course, to be discharged home in stable condition on PPD#2.          LABS:                        10.0   10.80 )-----------( 168      ( 2019 07:27 )             30.4                      12.5   10.68 )-----------( 219      ( 2019 00:00 )             38.1         Allergies    No Known Allergies

## 2019-11-17 NOTE — DISCHARGE NOTE OB - CARE PLAN
Principal Discharge DX:	Vaginal delivery  Goal:	Healthy recovery  Assessment and plan of treatment:	Monitor vitals/labs.  To follow up with provider in 6 weeks for postpartum visit.  Secondary Diagnosis:	Gestational hypertension  Goal:	BPs well controlled, asymptomatic  Assessment and plan of treatment:	monitor vitals/labs

## 2019-11-21 DIAGNOSIS — Z34.80 ENCOUNTER FOR SUPERVISION OF OTHER NORMAL PREGNANCY, UNSPECIFIED TRIMESTER: ICD-10-CM

## 2019-11-21 DIAGNOSIS — Z3A.39 39 WEEKS GESTATION OF PREGNANCY: ICD-10-CM

## 2019-11-21 DIAGNOSIS — Z98.890 OTHER SPECIFIED POSTPROCEDURAL STATES: ICD-10-CM

## 2019-11-21 DIAGNOSIS — Z79.899 OTHER LONG TERM (CURRENT) DRUG THERAPY: ICD-10-CM

## 2020-03-11 ENCOUNTER — EMERGENCY (EMERGENCY)
Facility: HOSPITAL | Age: 35
LOS: 0 days | Discharge: HOME | End: 2020-03-12
Attending: EMERGENCY MEDICINE | Admitting: EMERGENCY MEDICINE
Payer: COMMERCIAL

## 2020-03-11 VITALS
TEMPERATURE: 98 F | RESPIRATION RATE: 20 BRPM | HEIGHT: 62 IN | OXYGEN SATURATION: 99 % | WEIGHT: 240.08 LBS | HEART RATE: 71 BPM | DIASTOLIC BLOOD PRESSURE: 66 MMHG | SYSTOLIC BLOOD PRESSURE: 135 MMHG

## 2020-03-11 DIAGNOSIS — R10.9 UNSPECIFIED ABDOMINAL PAIN: ICD-10-CM

## 2020-03-11 DIAGNOSIS — R10.11 RIGHT UPPER QUADRANT PAIN: ICD-10-CM

## 2020-03-11 DIAGNOSIS — R10.13 EPIGASTRIC PAIN: ICD-10-CM

## 2020-03-11 PROCEDURE — 99285 EMERGENCY DEPT VISIT HI MDM: CPT

## 2020-03-11 PROCEDURE — 93010 ELECTROCARDIOGRAM REPORT: CPT

## 2020-03-11 RX ORDER — SODIUM CHLORIDE 9 MG/ML
1000 INJECTION, SOLUTION INTRAVENOUS ONCE
Refills: 0 | Status: COMPLETED | OUTPATIENT
Start: 2020-03-11 | End: 2020-03-11

## 2020-03-11 RX ADMIN — SODIUM CHLORIDE 1000 MILLILITER(S): 9 INJECTION, SOLUTION INTRAVENOUS at 23:55

## 2020-03-11 NOTE — ED ADULT TRIAGE NOTE - NS ED TRIAGE EKG FT
This RN spoke with Qian at [facility] at 0740. On call physician was paged, waiting for call back.    Dr. Woodruff

## 2020-03-11 NOTE — ED ADULT NURSE NOTE - NSIMPLEMENTINTERV_GEN_ALL_ED
Implemented All Universal Safety Interventions:  [place] to call system. Call bell, personal items and telephone within reach. Instruct patient to call for assistance. Room bathroom lighting operational. Non-slip footwear when patient is off stretcher. Physically safe environment: no spills, clutter or unnecessary equipment. Stretcher in lowest position, wheels locked, appropriate side rails in place.

## 2020-03-11 NOTE — ED ADULT TRIAGE NOTE - CHIEF COMPLAINT QUOTE
Patient complaining of R sided epigastric pain radiating to back and shoulder. Also complaining of dizziness starting today

## 2020-03-12 LAB
ALBUMIN SERPL ELPH-MCNC: 4.4 G/DL — SIGNIFICANT CHANGE UP (ref 3.5–5.2)
ALP SERPL-CCNC: 79 U/L — SIGNIFICANT CHANGE UP (ref 30–115)
ALT FLD-CCNC: 39 U/L — SIGNIFICANT CHANGE UP (ref 0–41)
ANION GAP SERPL CALC-SCNC: 17 MMOL/L — HIGH (ref 7–14)
APPEARANCE UR: ABNORMAL
AST SERPL-CCNC: 74 U/L — HIGH (ref 0–41)
BACTERIA # UR AUTO: ABNORMAL
BASOPHILS # BLD AUTO: 0.05 K/UL — SIGNIFICANT CHANGE UP (ref 0–0.2)
BASOPHILS NFR BLD AUTO: 0.5 % — SIGNIFICANT CHANGE UP (ref 0–1)
BILIRUB DIRECT SERPL-MCNC: <0.2 MG/DL — SIGNIFICANT CHANGE UP (ref 0–0.2)
BILIRUB INDIRECT FLD-MCNC: >0.1 MG/DL — LOW (ref 0.2–1.2)
BILIRUB SERPL-MCNC: 0.3 MG/DL — SIGNIFICANT CHANGE UP (ref 0.2–1.2)
BILIRUB UR-MCNC: NEGATIVE — SIGNIFICANT CHANGE UP
BUN SERPL-MCNC: 18 MG/DL — SIGNIFICANT CHANGE UP (ref 10–20)
CALCIUM SERPL-MCNC: 9.3 MG/DL — SIGNIFICANT CHANGE UP (ref 8.5–10.1)
CHLORIDE SERPL-SCNC: 103 MMOL/L — SIGNIFICANT CHANGE UP (ref 98–110)
CO2 SERPL-SCNC: 21 MMOL/L — SIGNIFICANT CHANGE UP (ref 17–32)
COLOR SPEC: YELLOW — SIGNIFICANT CHANGE UP
CREAT SERPL-MCNC: 0.8 MG/DL — SIGNIFICANT CHANGE UP (ref 0.7–1.5)
DIFF PNL FLD: ABNORMAL
EOSINOPHIL # BLD AUTO: 0.2 K/UL — SIGNIFICANT CHANGE UP (ref 0–0.7)
EOSINOPHIL NFR BLD AUTO: 2.2 % — SIGNIFICANT CHANGE UP (ref 0–8)
EPI CELLS # UR: 14 /HPF — HIGH (ref 0–5)
GLUCOSE SERPL-MCNC: 95 MG/DL — SIGNIFICANT CHANGE UP (ref 70–99)
GLUCOSE UR QL: NEGATIVE — SIGNIFICANT CHANGE UP
HCG SERPL QL: NEGATIVE — SIGNIFICANT CHANGE UP
HCT VFR BLD CALC: 36 % — LOW (ref 37–47)
HGB BLD-MCNC: 11.8 G/DL — LOW (ref 12–16)
HYALINE CASTS # UR AUTO: 6 /LPF — SIGNIFICANT CHANGE UP (ref 0–7)
IMM GRANULOCYTES NFR BLD AUTO: 0.2 % — SIGNIFICANT CHANGE UP (ref 0.1–0.3)
KETONES UR-MCNC: NEGATIVE — SIGNIFICANT CHANGE UP
LEUKOCYTE ESTERASE UR-ACNC: ABNORMAL
LIDOCAIN IGE QN: 42 U/L — SIGNIFICANT CHANGE UP (ref 7–60)
LYMPHOCYTES # BLD AUTO: 1.67 K/UL — SIGNIFICANT CHANGE UP (ref 1.2–3.4)
LYMPHOCYTES # BLD AUTO: 18.3 % — LOW (ref 20.5–51.1)
MCHC RBC-ENTMCNC: 28.2 PG — SIGNIFICANT CHANGE UP (ref 27–31)
MCHC RBC-ENTMCNC: 32.8 G/DL — SIGNIFICANT CHANGE UP (ref 32–37)
MCV RBC AUTO: 85.9 FL — SIGNIFICANT CHANGE UP (ref 81–99)
MONOCYTES # BLD AUTO: 0.54 K/UL — SIGNIFICANT CHANGE UP (ref 0.1–0.6)
MONOCYTES NFR BLD AUTO: 5.9 % — SIGNIFICANT CHANGE UP (ref 1.7–9.3)
NEUTROPHILS # BLD AUTO: 6.66 K/UL — HIGH (ref 1.4–6.5)
NEUTROPHILS NFR BLD AUTO: 72.9 % — SIGNIFICANT CHANGE UP (ref 42.2–75.2)
NITRITE UR-MCNC: NEGATIVE — SIGNIFICANT CHANGE UP
NRBC # BLD: 0 /100 WBCS — SIGNIFICANT CHANGE UP (ref 0–0)
PH UR: 6.5 — SIGNIFICANT CHANGE UP (ref 5–8)
PLATELET # BLD AUTO: 238 K/UL — SIGNIFICANT CHANGE UP (ref 130–400)
POTASSIUM SERPL-MCNC: 3.9 MMOL/L — SIGNIFICANT CHANGE UP (ref 3.5–5)
POTASSIUM SERPL-SCNC: 3.9 MMOL/L — SIGNIFICANT CHANGE UP (ref 3.5–5)
PROT SERPL-MCNC: 7.1 G/DL — SIGNIFICANT CHANGE UP (ref 6–8)
PROT UR-MCNC: ABNORMAL
RBC # BLD: 4.19 M/UL — LOW (ref 4.2–5.4)
RBC # FLD: 14.3 % — SIGNIFICANT CHANGE UP (ref 11.5–14.5)
RBC CASTS # UR COMP ASSIST: 262 /HPF — HIGH (ref 0–4)
SODIUM SERPL-SCNC: 141 MMOL/L — SIGNIFICANT CHANGE UP (ref 135–146)
SP GR SPEC: 1.03 — HIGH (ref 1.01–1.02)
UROBILINOGEN FLD QL: SIGNIFICANT CHANGE UP
WBC # BLD: 9.14 K/UL — SIGNIFICANT CHANGE UP (ref 4.8–10.8)
WBC # FLD AUTO: 9.14 K/UL — SIGNIFICANT CHANGE UP (ref 4.8–10.8)
WBC UR QL: 39 /HPF — HIGH (ref 0–5)

## 2020-03-12 RX ADMIN — SODIUM CHLORIDE 1000 MILLILITER(S): 9 INJECTION, SOLUTION INTRAVENOUS at 01:22

## 2020-03-12 NOTE — ED PROVIDER NOTE - PATIENT PORTAL LINK FT
You can access the FollowMyHealth Patient Portal offered by [facility] by registering at the following website: [email or web address]. By joining [employer]’s FollowMyHealth portal, you will also be able to view your health information using other applications (apps) compatible with our system.

## 2020-03-12 NOTE — ED PROVIDER NOTE - ATTENDING CONTRIBUTION TO CARE
34y f no pmh p/w epigastric pain x 1d. Radiating to ruq & shoulder. No f/c, cp/sob, nvd, flank pain, urinary sx, rash. PMD Fabiano.    PE:  nad  skin warm, dry  ncat  neck supple  rrr nl s1s2 no mrg  ctab no wrr  abd soft +epigastric ttp rest non-tender no palpable masses no rgr  back non-tender no cvat  ext no cce dpi  neuro aaox3 grossly nf exam

## 2020-03-12 NOTE — ED PROVIDER NOTE - CARE PROVIDER_API CALL
Mohan Mario)  Family Medicine  [address] [place] [address]  Phone: [phone]  Fax: [phone]  Follow Up Time: 1-3 Days

## 2020-03-12 NOTE — ED PROVIDER NOTE - NS ED ROS FT
Constitutional: (-) fever  Eyes/ENT: (-) runny nose  Cardiovascular: (-) chest pain, (-) syncope  Respiratory: (-) cough, (-) shortness of breath  Gastrointestinal: (-) vomiting, (-) diarrhea, (+) abdominal pain  : (-) dysuria   Musculoskeletal: (-) back pain, (-) joint pain  Integumentary: (-) rash  Neurological: (-)loc  Allergic/Immunologic: (-) pruritus  Endocrine: (-) history of thyroid disease

## 2020-03-12 NOTE — ED PROVIDER NOTE - PHYSICAL EXAMINATION
CONSTITUTIONAL: Well-developed; well-nourished; in no acute distress.   SKIN: warm, dry  HEAD: Normocephalic.  EYES: PERRL, EOMI.  ENT: Airway clear.  NECK: Supple.  LYMPH: No acute cervical adenopathy.  CARD: No murmurs, rubs or gallops. Regular rate and rhythm.   RESP: No wheezing, rales or rhonchi.  ABD: soft, RUQ TTP, no peritoneal signs, no Medel sign.  EXT: No clubbing, cyanosis.   NEURO: Alert, oriented.  PSYCH: Cooperative, appropriate.

## 2020-03-12 NOTE — ED PROVIDER NOTE - CLINICAL SUMMARY MEDICAL DECISION MAKING FREE TEXT BOX
epigastric pain - mild transaminitis, remaining labs wnl, ua/micro noted however pt denies any urinary sx - pt offered bedside RUQ US to eval GB however requested to leave prior to completion to go home and nurse her baby - all results d/w pt & copies given, strict return precautions discussed, rec outpt PMD/GI f/u (pt has private GI)

## 2020-03-12 NOTE — ED PROVIDER NOTE - OBJECTIVE STATEMENT
34F presents with RUQ pain, rad to R shoulder, moderate, intermittent, no exac/relieving factors. PT denies fever, chills, n/v/d, recent travel, abx, new foods, sick contacts.

## 2020-03-12 NOTE — ED PROVIDER NOTE - PROGRESS NOTE DETAILS
SC: PT states that she would like to leave before workup complete. R/B/A of staying for US vs discharge discussed abd PT would like to leave.

## 2020-05-04 NOTE — ED PROVIDER NOTE - PENDING LAB RAD OPT OUT
noted on chart
Exclude Pending Lab and Radiology orders from printing on the Patient's Discharge Instructions, due to Privacy Concerns.

## 2020-06-19 ENCOUNTER — EMERGENCY (EMERGENCY)
Facility: HOSPITAL | Age: 35
LOS: 0 days | Discharge: HOME | End: 2020-06-19
Attending: EMERGENCY MEDICINE | Admitting: STUDENT IN AN ORGANIZED HEALTH CARE EDUCATION/TRAINING PROGRAM
Payer: COMMERCIAL

## 2020-06-19 VITALS
SYSTOLIC BLOOD PRESSURE: 139 MMHG | RESPIRATION RATE: 18 BRPM | HEART RATE: 71 BPM | OXYGEN SATURATION: 100 % | TEMPERATURE: 99 F | WEIGHT: 244.93 LBS | DIASTOLIC BLOOD PRESSURE: 83 MMHG

## 2020-06-19 VITALS
HEART RATE: 60 BPM | DIASTOLIC BLOOD PRESSURE: 56 MMHG | TEMPERATURE: 98 F | SYSTOLIC BLOOD PRESSURE: 104 MMHG | RESPIRATION RATE: 18 BRPM | OXYGEN SATURATION: 100 %

## 2020-06-19 DIAGNOSIS — R10.11 RIGHT UPPER QUADRANT PAIN: ICD-10-CM

## 2020-06-19 DIAGNOSIS — Z79.899 OTHER LONG TERM (CURRENT) DRUG THERAPY: ICD-10-CM

## 2020-06-19 DIAGNOSIS — K80.20 CALCULUS OF GALLBLADDER WITHOUT CHOLECYSTITIS WITHOUT OBSTRUCTION: ICD-10-CM

## 2020-06-19 LAB
ALBUMIN SERPL ELPH-MCNC: 4.9 G/DL — SIGNIFICANT CHANGE UP (ref 3.5–5.2)
ALP SERPL-CCNC: 76 U/L — SIGNIFICANT CHANGE UP (ref 30–115)
ALT FLD-CCNC: 99 U/L — HIGH (ref 0–41)
ANION GAP SERPL CALC-SCNC: 16 MMOL/L — HIGH (ref 7–14)
APPEARANCE UR: CLEAR — SIGNIFICANT CHANGE UP
AST SERPL-CCNC: 151 U/L — HIGH (ref 0–41)
BASOPHILS # BLD AUTO: 0.03 K/UL — SIGNIFICANT CHANGE UP (ref 0–0.2)
BASOPHILS NFR BLD AUTO: 0.3 % — SIGNIFICANT CHANGE UP (ref 0–1)
BILIRUB DIRECT SERPL-MCNC: 0.5 MG/DL — HIGH (ref 0–0.2)
BILIRUB INDIRECT FLD-MCNC: 0.5 MG/DL — SIGNIFICANT CHANGE UP (ref 0.2–1.2)
BILIRUB SERPL-MCNC: 1 MG/DL — SIGNIFICANT CHANGE UP (ref 0.2–1.2)
BILIRUB UR-MCNC: NEGATIVE — SIGNIFICANT CHANGE UP
BUN SERPL-MCNC: 15 MG/DL — SIGNIFICANT CHANGE UP (ref 10–20)
CALCIUM SERPL-MCNC: 9.6 MG/DL — SIGNIFICANT CHANGE UP (ref 8.5–10.1)
CHLORIDE SERPL-SCNC: 104 MMOL/L — SIGNIFICANT CHANGE UP (ref 98–110)
CO2 SERPL-SCNC: 20 MMOL/L — SIGNIFICANT CHANGE UP (ref 17–32)
COLOR SPEC: YELLOW — SIGNIFICANT CHANGE UP
CREAT SERPL-MCNC: 0.8 MG/DL — SIGNIFICANT CHANGE UP (ref 0.7–1.5)
DIFF PNL FLD: NEGATIVE — SIGNIFICANT CHANGE UP
EOSINOPHIL # BLD AUTO: 0.04 K/UL — SIGNIFICANT CHANGE UP (ref 0–0.7)
EOSINOPHIL NFR BLD AUTO: 0.4 % — SIGNIFICANT CHANGE UP (ref 0–8)
GLUCOSE SERPL-MCNC: 114 MG/DL — HIGH (ref 70–99)
GLUCOSE UR QL: NEGATIVE — SIGNIFICANT CHANGE UP
HCG SERPL QL: NEGATIVE — SIGNIFICANT CHANGE UP
HCT VFR BLD CALC: 41.9 % — SIGNIFICANT CHANGE UP (ref 37–47)
HGB BLD-MCNC: 13.8 G/DL — SIGNIFICANT CHANGE UP (ref 12–16)
IMM GRANULOCYTES NFR BLD AUTO: 0.1 % — SIGNIFICANT CHANGE UP (ref 0.1–0.3)
KETONES UR-MCNC: NEGATIVE — SIGNIFICANT CHANGE UP
LACTATE SERPL-SCNC: 1.1 MMOL/L — SIGNIFICANT CHANGE UP (ref 0.7–2)
LACTATE SERPL-SCNC: 2.6 MMOL/L — HIGH (ref 0.7–2)
LEUKOCYTE ESTERASE UR-ACNC: NEGATIVE — SIGNIFICANT CHANGE UP
LIDOCAIN IGE QN: 19 U/L — SIGNIFICANT CHANGE UP (ref 7–60)
LYMPHOCYTES # BLD AUTO: 1.8 K/UL — SIGNIFICANT CHANGE UP (ref 1.2–3.4)
LYMPHOCYTES # BLD AUTO: 18.4 % — LOW (ref 20.5–51.1)
MCHC RBC-ENTMCNC: 27.8 PG — SIGNIFICANT CHANGE UP (ref 27–31)
MCHC RBC-ENTMCNC: 32.9 G/DL — SIGNIFICANT CHANGE UP (ref 32–37)
MCV RBC AUTO: 84.3 FL — SIGNIFICANT CHANGE UP (ref 81–99)
MONOCYTES # BLD AUTO: 0.45 K/UL — SIGNIFICANT CHANGE UP (ref 0.1–0.6)
MONOCYTES NFR BLD AUTO: 4.6 % — SIGNIFICANT CHANGE UP (ref 1.7–9.3)
NEUTROPHILS # BLD AUTO: 7.43 K/UL — HIGH (ref 1.4–6.5)
NEUTROPHILS NFR BLD AUTO: 76.2 % — HIGH (ref 42.2–75.2)
NITRITE UR-MCNC: NEGATIVE — SIGNIFICANT CHANGE UP
NRBC # BLD: 0 /100 WBCS — SIGNIFICANT CHANGE UP (ref 0–0)
PH UR: 6.5 — SIGNIFICANT CHANGE UP (ref 5–8)
PLATELET # BLD AUTO: 301 K/UL — SIGNIFICANT CHANGE UP (ref 130–400)
POTASSIUM SERPL-MCNC: 4.2 MMOL/L — SIGNIFICANT CHANGE UP (ref 3.5–5)
POTASSIUM SERPL-SCNC: 4.2 MMOL/L — SIGNIFICANT CHANGE UP (ref 3.5–5)
PROT SERPL-MCNC: 7.7 G/DL — SIGNIFICANT CHANGE UP (ref 6–8)
PROT UR-MCNC: SIGNIFICANT CHANGE UP
RBC # BLD: 4.97 M/UL — SIGNIFICANT CHANGE UP (ref 4.2–5.4)
RBC # FLD: 14 % — SIGNIFICANT CHANGE UP (ref 11.5–14.5)
SODIUM SERPL-SCNC: 140 MMOL/L — SIGNIFICANT CHANGE UP (ref 135–146)
SP GR SPEC: 1.03 — HIGH (ref 1.01–1.02)
UROBILINOGEN FLD QL: SIGNIFICANT CHANGE UP
WBC # BLD: 9.76 K/UL — SIGNIFICANT CHANGE UP (ref 4.8–10.8)
WBC # FLD AUTO: 9.76 K/UL — SIGNIFICANT CHANGE UP (ref 4.8–10.8)

## 2020-06-19 PROCEDURE — 76705 ECHO EXAM OF ABDOMEN: CPT | Mod: 26

## 2020-06-19 PROCEDURE — 76775 US EXAM ABDO BACK WALL LIM: CPT | Mod: 26

## 2020-06-19 PROCEDURE — 99283 EMERGENCY DEPT VISIT LOW MDM: CPT

## 2020-06-19 PROCEDURE — 99284 EMERGENCY DEPT VISIT MOD MDM: CPT

## 2020-06-19 RX ORDER — KETOROLAC TROMETHAMINE 30 MG/ML
15 SYRINGE (ML) INJECTION ONCE
Refills: 0 | Status: DISCONTINUED | OUTPATIENT
Start: 2020-06-19 | End: 2020-06-19

## 2020-06-19 RX ORDER — SODIUM CHLORIDE 9 MG/ML
1000 INJECTION INTRAMUSCULAR; INTRAVENOUS; SUBCUTANEOUS ONCE
Refills: 0 | Status: COMPLETED | OUTPATIENT
Start: 2020-06-19 | End: 2020-06-19

## 2020-06-19 RX ORDER — ONDANSETRON 8 MG/1
4 TABLET, FILM COATED ORAL ONCE
Refills: 0 | Status: COMPLETED | OUTPATIENT
Start: 2020-06-19 | End: 2020-06-19

## 2020-06-19 RX ADMIN — Medication 15 MILLIGRAM(S): at 03:55

## 2020-06-19 RX ADMIN — ONDANSETRON 4 MILLIGRAM(S): 8 TABLET, FILM COATED ORAL at 03:55

## 2020-06-19 RX ADMIN — SODIUM CHLORIDE 2000 MILLILITER(S): 9 INJECTION INTRAMUSCULAR; INTRAVENOUS; SUBCUTANEOUS at 04:01

## 2020-06-19 RX ADMIN — Medication 15 MILLIGRAM(S): at 06:15

## 2020-06-19 NOTE — ED PROVIDER NOTE - OBJECTIVE STATEMENT
35 yo f hx gallstones here for RUQ pain. pt states pain began at 7 pm. associated nausea and vomiting. some waxing/waning of intensity prior to coming to ed. no dysuria/hematuria. pain radiates to back. no cp, sob, fever or chills.

## 2020-06-19 NOTE — CONSULT NOTE ADULT - ASSESSMENT
ASSESSMENT:  34y Female with symptomatic cholelithiais    PLAN:  Pain resolved, nontender  Please follow up with Dr. Jason mcmullen for elective cholecystectomy  Call the office to schedule apt  D/w Dr. Gomez

## 2020-06-19 NOTE — ED PROVIDER NOTE - CARE PROVIDERS DIRECT ADDRESSES
6/27/2019         RE: Rachel Rhoades  [address]  [facility] [address]        Dear Colleague,    Thank you for referring your patient, Rachel Rhoades, to the [facility]. Please see a copy of my visit note below.    [facility] - PRIMARY CARE SKIN    CC: Acne  SUBJECTIVE:   Rachel Rhoades is a(n) 28 year old female who presents to clinic today because of acne.    Symptoms have been ongoing for: years.  The acne is primarily located on the: face.  Acne generally presents as: cystic acne has been more bothersome in the past couple of years. She did not have acne in adolescence.    Current treatment: Currently on ortho tri-cyclen. Clean and clear cleansers used.    Previous treatments include: intralesional triamcinolone, amoxicillin, tretinoin, benzoyl peroxide.    Skin type: oily/dry combination .  Family history of acne: YES - cystic acne in father.  Risk factors for acne: summer, sports involvement.    She is planning on getting  next year.    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    Refer to electronic medical record (EMR) for past medical history and medications.    INTEGUMENTARY/SKIN: POSITIVE for acne  ROS: 14 point review of systems was negative except the symptoms listed above in the HPI.    This document serves as a record of the services and decisions personally performed and made by Tika Jama MD and was created by Carlos Nassar, a trained medical scribe, based on personal observations and provider statements to the medical scribe.  June 27, 2019 3:13 PM   Carlos Nassar    OBJECTIVE:   GENERAL: healthy, alert and no distress.  SKIN: Roy Skin Type - III.  Face examined. The dermatoscope was used to help evaluate pigmented lesions.  Skin Pertinent Findings:  Face: Resolving cystic nodule on left cheek. residual scarring. few inflammatory papules on upper forehead.  Moderate scarring.    Diagnostic Test Results:  none     ASSESSMENT:     Encounter Diagnoses   Name Primary?     Nodulocystic acne Yes     Acne vulgaris      Encounter for therapeutic drug monitoring      MDM: . Discussed pathophysiology of acne, treatment options, and expectations for treatment response.    PLAN:   Patient Instructions   FUTURE APPOINTMENTS  Follow up in 2-3 month(s).    ORAL MEDICATION  First two weeks: Take by mouth 0.5 capsule(s)/tablet(s) of spironolactone 50 mg once daily.  After two weeks: Take by mouth 1 capsule(s)/tablet(s) of spironolactone 50 mg once daily.    ACNE TREATMENT PLAN  Continue current cleansers.    Bedtime :    Lastly, before going to bed, apply topical Differin 0.1% gel.    Wait until face is dry after cleansing before application.    Use just a pea-sized amount for application.      BCP - Continue taking your Ortho Tri Cyclen.    Recommendations if skin becomes too dry in response to medication:    Use Cetaphil facial moisturizer.    Alternate application of Adapalene every other night for 2 weeks, to condition the skin. After 2 weeks, retry nightly application.        TT: 20 minutes.  CT: 15 minutes.    The information in this document, created by the medical scribe for me, accurately reflects the services I personally performed and the decisions made by me. I have reviewed and approved this document for accuracy prior to leaving the patient care area.  June 27, 2019 3:13 PM  Tika Jama MD  [facility]    Again, thank you for allowing me to participate in the care of your patient.        Sincerely,        Tika Jama MD     ,DirectAddress_Unknown

## 2020-06-19 NOTE — ED PROVIDER NOTE - NS ED ROS FT
Constitutional: no fever  Cardiovascular: no chest pain  Respiratory:  no cough  Abdominal: pos abdominal pain, nausea, vomiting  Neurological: no altered mental status

## 2020-06-19 NOTE — ED PROVIDER NOTE - CARE PROVIDER_API CALL
Juan Antonio Gomez [facility]  Surgery Critical Care-Surgery  [address] [place] [id number]  Phone: [phone]  Fax: [phone]  Follow Up Time:

## 2020-06-19 NOTE — ED ADULT NURSE NOTE - OBJECTIVE STATEMENT
Patient presents to ER in NAD A&OX4 complaining of severe right sided back pain and RUQ pain since 7Pm this evening with nausea, vomiting and diarrhea. Patient took Tylenol 1,000MG at 9PM. Patient denies any fevers, sob or chest pain at this time.

## 2020-06-19 NOTE — ED PROVIDER NOTE - PATIENT PORTAL LINK FT
You can access the FollowMyHealth Patient Portal offered by [facility] by registering at the following website: [email or web address]. By joining [facility]’s FollowMyHealth portal, you will also be able to view your health information using other applications (apps) compatible with our system.

## 2020-06-19 NOTE — ED PROVIDER NOTE - PROGRESS NOTE DETAILS
likely biliary colic, r/o cholecystitis/choledocolithiasis, less likely nephrolithiasis, r/o pancreatitis pain slightly improved but moderate. will give additional dose of toradol. pt refusing morphine as she has had bad experience 2/2 side effects. surgery consulted given pain and elevation of LFTs. case d/w surg resident d/w surg resident Diana- surgery rounding, will see patient soon pain slightly improved but moderate. will give additional dose of toradol. pt refusing morphine as she has had bad experience 2/2 side effects. discussed admission given abnormal LFTs for monitoring and further imaging. pt states she does not want to stay unless the gallbladder is removed as she has a young child at home. surgery consulted given persistent pain and elevation of LFTs. case d/w surg resident Pt endorsed to Dr. Colon- pending surg recs, reassessment

## 2020-06-19 NOTE — CONSULT NOTE ADULT - SUBJECTIVE AND OBJECTIVE BOX
KATIE GONZALES [id number]  34y Female    HPI:  35 yo PMH/PSH cholelithiasis, vaginal delivery 2019. Presents with RUQ pain. pt states pain began at 7 pm. Associated w/ nausea and vomiting. Aching in quality,  waxing/waning of intensity prior to coming to ed. Radieted to the back. Similar episodes ofver the past 3 months. Pt denies CP, SOB, n/v/d, fever, chills, urinary symptoms.    PAST MEDICAL & SURGICAL HISTORY:  Gallstones  No pertinent past medical history  No significant past surgical history      MEDICATIONS  (STANDING):    MEDICATIONS  (PRN):    Allergies    No Known Allergies    REVIEW OF SYSTEMS    [x] A ten-point review of systems was otherwise negative except as noted.  [ ] Due to altered mental status/intubation, subjective information were not able to be obtained from the patient. History was obtained, to the extent possible, from review of the chart and collateral sources of information.      Vital Signs Last 24 Hrs  T(C): 36.6 (2020 07:30), Max: 37.3 (2020 03:18)  T(F): 97.8 (2020 07:30), Max: 99.1 (2020 03:18)  HR: 51 (2020 07:30) (51 - 71)  BP: 99/56 (2020 07:30) (99/56 - 139/83)  BP(mean): --  RR: 18 (2020 07:30) (18 - 18)  SpO2: 100% (2020 07:30) (100% - 100%)    PHYSICAL EXAM:  GENERAL: NAD, well-appearing  CHEST/LUNG: Clear to auscultation bilaterally  HEART: Regular rate and rhythm  ABDOMEN: Soft, Nontender, Nondistended; No Medel's sign  EXTREMITIES:  No clubbing, cyanosis, or edema      LABS:  Labs:  CAPILLARY BLOOD GLUCOSE                        13.8   9.76  )-----------( 301      ( 2020 03:50 )             41.9       Auto Neutrophil %: 76.2 % (20 @ 03:50)  Auto Immature Granulocyte %: 0.1 % (20 @ 03:50)        140  |  104  |  15  ----------------------------<  114<H>  4.2   |  20  |  0.8      Calcium, Total Serum: 9.6 mg/dL (20 @ 03:50)      LFTs:             7.7  | 1.0  | 151      ------------------[76      ( 2020 03:50 )  4.9  | 0.5  | 99          Lipase:19     Amylase:x         Lactate, Blood: 1.1 mmol/L (20 @ 05:29)  Lactate, Blood: 2.6 mmol/L (20 @ 03:50)      Coags:      Urinalysis Basic - ( 2020 03:50 )    Color: Yellow / Appearance: Clear / S.029 / pH: x  Gluc: x / Ketone: Negative  / Bili: Negative / Urobili: <2 mg/dL   Blood: x / Protein: Trace / Nitrite: Negative   Leuk Esterase: Negative / RBC: x / WBC x   Sq Epi: x / Non Sq Epi: x / Bacteria: x      RADIOLOGY & ADDITIONAL STUDIES:    < from: US Abdomen Limited (20 @ 05:09) >  Cholelithiasis without sonographic evidence of acute cholecystitis.    Please see separate report for evaluation of kidneys.    < end of copied text >

## 2020-06-19 NOTE — ED POST DISCHARGE NOTE - RESULT SUMMARY
Patient called ER stating she is still in a lot of pain - advised patient to return to ED if she continues to be in pain.

## 2020-06-19 NOTE — ED ADULT NURSE REASSESSMENT NOTE - NS ED NURSE REASSESS COMMENT FT1
pt assessed, alert and oriented, pt stated she is waiting for surgical team to see her, NAD, Safety precautions maintained, will continue to monitor.

## 2020-06-19 NOTE — CONSULT NOTE ADULT - ATTENDING COMMENTS
cholelithiasis without cholecystitis   biliary colic   pain control   d/c home   f/u in the office for lap elisabeth

## 2021-09-13 NOTE — OB PROVIDER H&P - PRO PRENATAL LABS ORI SOURCE BLOOD TYPE
Depo Provera  150 mg given IM as ordered in right deltoid. Patient tolerated injection well. Reminder card with 12 weeks date provided. hard copy, drawn during this pregnancy

## 2021-11-04 NOTE — PROCEDURE NOTE - PRACTITIONER PERFORMING THE TIME OUT
Watson Chadwick CRNA Cephalexin Counseling: I counseled the patient regarding use of cephalexin as an antibiotic for prophylactic and/or therapeutic purposes. Cephalexin (commonly prescribed under brand name Keflex) is a cephalosporin antibiotic which is active against numerous classes of bacteria, including most skin bacteria. Side effects may include nausea, diarrhea, gastrointestinal upset, rash, hives, yeast infections, and in rare cases, hepatitis, kidney disease, seizures, fever, confusion, neurologic symptoms, and others. Patients with severe allergies to penicillin medications are cautioned that there is about a 10% incidence of cross-reactivity with cephalosporins. When possible, patients with penicillin allergies should use alternatives to cephalosporins for antibiotic therapy.

## 2022-08-17 NOTE — ED ADULT NURSE NOTE - NS ED NURSE RECORD ANOTHER VITAL SIGN
Yes Klisyri Pregnancy And Lactation Text: It is unknown if this medication can harm a developing fetus or if it is excreted in breast milk.

## 2023-02-25 NOTE — OB RN PATIENT PROFILE - NSTOBACCONEVERSMOKERY/N_GEN_A
To Our Patients,     As your Ophthalmologists, we always strive to provide the highest service with outstanding quality and safety. Historically, we have provided our pediatric patients with a one drop solution prior to eye exams in order to dilate the pupils. This was achieved by using a compounded eye drop that was custom-formulated by an outside pharmacy from the 4 necessary medications needed before the exam. This made it possible to administer 4 separate medications in one drop, which saved the possible stress/discomfort for our patients to achieve the same result. This has been our long term practice to provide comfort to our patients and we have great outcomes.    However, due to recent changes in federal regulations, we must now notify patients receiving any compounded medication that there are certain risks involved with using any compounded medication, including but not limited to potential for infection even though these may be rare.      We can administer this one drop compounded medication, or we can administer all 4 medications as 4 separate drops. Please indicate below how you would like the medications to be administered during your child’s exam today. Please know that if all 4 drops are not successfully able to be administered, you may be asked to return at a later date to perform the eye exam.      [   ] Please administer the one drop Compounded Medication for the eye exam.    [    ] Please administer the medications as separate drops for the eye exam.     Please print Patient Name: ____Marcia Jessicaaudelia___________________________    Patient : __2010__    MRN:___[id number]_____________    Parent or Legal Guardian Name & signature:_______________________________________________    Date: ______________________    
No

## 2024-07-18 NOTE — OB RN PATIENT PROFILE - PURPOSEFUL PROACTIVE ROUNDING
Detail Level: Zone Render In Strict Bullet Format?: No Discontinue Regimen: Oracea 40 mg capsule,immediate - Take one capsule once daily with dinner Continue Regimen: tretinoin 0.025 % topical cream - apply a pea sized amount to full face at bedtime after cleansing\\n\\nclindamycin 1 % lotion - Apply pea-sized amount to the full face every morning after cleansing\\n\\nOnexton 1.2 % (1 % base)-3.75 % topical gel - Apply a pea sized amount to full face every morning after cleansing Patient

## 2025-01-25 NOTE — OB PROVIDER H&P - PRO PRENATAL LABS ORI SOURCE RUBELLA
The sutures used today will absorb over the next week. If you see any signs of infection please come back to the ED. Wear sunscreen and avoid uv rays for up to a year to avoid collagen crosslinking which leads to scar formation. You may use vitamin e ointment or aquaphor as needed over the area. Keep the area clean and dry otherwise.   
hard copy, drawn during this pregnancy